# Patient Record
Sex: MALE | Race: WHITE | Employment: OTHER | URBAN - METROPOLITAN AREA
[De-identification: names, ages, dates, MRNs, and addresses within clinical notes are randomized per-mention and may not be internally consistent; named-entity substitution may affect disease eponyms.]

---

## 2017-04-06 RX ORDER — ALPRAZOLAM 0.5 MG/1
TABLET ORAL
Qty: 30 TABLET | Refills: 0 | Status: SHIPPED | OUTPATIENT
Start: 2017-04-06 | End: 2018-09-05

## 2017-10-27 ENCOUNTER — LABORATORY ENCOUNTER (OUTPATIENT)
Dept: LAB | Age: 62
End: 2017-10-27
Attending: INTERNAL MEDICINE
Payer: COMMERCIAL

## 2017-10-27 DIAGNOSIS — Z00.00 ROUTINE GENERAL MEDICAL EXAMINATION AT A HEALTH CARE FACILITY: ICD-10-CM

## 2017-10-27 PROCEDURE — 36415 COLL VENOUS BLD VENIPUNCTURE: CPT

## 2017-10-27 PROCEDURE — 83036 HEMOGLOBIN GLYCOSYLATED A1C: CPT

## 2017-10-27 PROCEDURE — 84443 ASSAY THYROID STIM HORMONE: CPT

## 2017-10-27 PROCEDURE — 85025 COMPLETE CBC W/AUTO DIFF WBC: CPT

## 2017-10-27 PROCEDURE — 80053 COMPREHEN METABOLIC PANEL: CPT

## 2017-10-27 PROCEDURE — 82306 VITAMIN D 25 HYDROXY: CPT

## 2017-10-27 PROCEDURE — 80061 LIPID PANEL: CPT

## 2017-10-27 PROCEDURE — 81003 URINALYSIS AUTO W/O SCOPE: CPT

## 2017-11-03 ENCOUNTER — OFFICE VISIT (OUTPATIENT)
Dept: INTERNAL MEDICINE CLINIC | Facility: CLINIC | Age: 62
End: 2017-11-03

## 2017-11-03 VITALS
DIASTOLIC BLOOD PRESSURE: 84 MMHG | HEIGHT: 73.25 IN | WEIGHT: 195.75 LBS | HEART RATE: 60 BPM | RESPIRATION RATE: 15 BRPM | BODY MASS INDEX: 25.66 KG/M2 | TEMPERATURE: 98 F | SYSTOLIC BLOOD PRESSURE: 134 MMHG

## 2017-11-03 DIAGNOSIS — Z23 NEED FOR SHINGLES VACCINE: ICD-10-CM

## 2017-11-03 DIAGNOSIS — Z00.00 ROUTINE GENERAL MEDICAL EXAMINATION AT A HEALTH CARE FACILITY: Primary | ICD-10-CM

## 2017-11-03 PROBLEM — R73.03 PREDIABETES: Status: ACTIVE | Noted: 2017-11-03

## 2017-11-03 PROCEDURE — 90736 HZV VACCINE LIVE SUBQ: CPT | Performed by: INTERNAL MEDICINE

## 2017-11-03 PROCEDURE — 99396 PREV VISIT EST AGE 40-64: CPT | Performed by: INTERNAL MEDICINE

## 2017-11-03 PROCEDURE — 90471 IMMUNIZATION ADMIN: CPT | Performed by: INTERNAL MEDICINE

## 2017-11-03 NOTE — PROGRESS NOTES
Patient presents with:  CPX: not fasting      HPI: The pt presents today for male CPX. He went for wellness labs recently (see below). The only issue was a slightly elevated A1c into the prediabetes range.   He's due for Shingles vaccine, otherwise, he's 11/22/2015  09/10/2016  10/15/2017      TDAP                  11/25/2013      Td                    07/05/2011    Pended                  Date(s) Pended    Zoster (Shingles)     11/03/2017        PE:  /84 (BP Location: Right arm, Patient Positio >=60 79   CALCIUM      8.3 - 10.3 mg/dL 9.1   ALKALINE PHOSPHATASE      45 - 117 U/L 69   AST (SGOT)      15 - 41 U/L 23   ALT (SGPT)      17 - 63 U/L 22   Total Bilirubin      0.1 - 2.0 mg/dL 0.9   TOTAL PROTEIN      6.1 - 8.3 g/dL 6.8   Albumin      3.5 of Internal Medicine  [facility]  [address] [facility],4Th Floor, [address], [facility], [address]  T: [phone]; F: [phone]       Orders Placed This Encounter      Zoster (Shingles) (34813) (DX V05.8/Z23)    Meds & Refills for this Visit:  No prescri

## 2018-08-21 ENCOUNTER — HOSPITAL ENCOUNTER (OUTPATIENT)
Dept: GENERAL RADIOLOGY | Age: 63
Discharge: HOME | End: 2018-08-21
Attending: INTERNAL MEDICINE
Payer: COMMERCIAL

## 2018-08-21 ENCOUNTER — OFFICE VISIT (OUTPATIENT)
Dept: INTERNAL MEDICINE CLINIC | Facility: CLINIC | Age: 63
End: 2018-08-21
Payer: COMMERCIAL

## 2018-08-21 VITALS
WEIGHT: 188.5 LBS | HEART RATE: 60 BPM | SYSTOLIC BLOOD PRESSURE: 127 MMHG | DIASTOLIC BLOOD PRESSURE: 72 MMHG | HEIGHT: 72.75 IN | BODY MASS INDEX: 24.98 KG/M2 | TEMPERATURE: 98 F | RESPIRATION RATE: 20 BRPM

## 2018-08-21 DIAGNOSIS — G89.29 CHRONIC MIDLINE LOW BACK PAIN WITHOUT SCIATICA: Primary | ICD-10-CM

## 2018-08-21 DIAGNOSIS — M19.041 ARTHRITIS OF FINGER OF BOTH HANDS: ICD-10-CM

## 2018-08-21 DIAGNOSIS — M54.50 CHRONIC MIDLINE LOW BACK PAIN WITHOUT SCIATICA: Primary | ICD-10-CM

## 2018-08-21 DIAGNOSIS — M54.50 CHRONIC MIDLINE LOW BACK PAIN WITHOUT SCIATICA: ICD-10-CM

## 2018-08-21 DIAGNOSIS — M19.042 ARTHRITIS OF FINGER OF BOTH HANDS: ICD-10-CM

## 2018-08-21 DIAGNOSIS — M67.449 DIGITAL MUCOUS CYST OF FINGER: ICD-10-CM

## 2018-08-21 DIAGNOSIS — G89.29 CHRONIC MIDLINE LOW BACK PAIN WITHOUT SCIATICA: ICD-10-CM

## 2018-08-21 PROCEDURE — 99214 OFFICE O/P EST MOD 30 MIN: CPT | Performed by: INTERNAL MEDICINE

## 2018-08-21 PROCEDURE — 72110 X-RAY EXAM L-2 SPINE 4/>VWS: CPT | Performed by: INTERNAL MEDICINE

## 2018-08-21 RX ORDER — HYDROCORTISONE ACETATE 0.5 %
CREAM (GRAM) TOPICAL
COMMUNITY
Start: 2018-04-02 | End: 2021-05-20 | Stop reason: ALTCHOICE

## 2018-08-21 NOTE — PROGRESS NOTES
Patient presents with:  Low Back Pain: last 6 weeks   Other: mucous cyst of finger      HPI: eTo Morales presents today for the following issues:    1.  Acute on chronic intermittent back pain - His pain goes back quite some time, but he's really noticed this ove Cuff Size: adult)   Pulse 60   Temp 98.2 °F (36.8 °C) (Oral)   Resp 20   Ht 72.75\"   Wt 188 lb 8 oz   BMI 25.04 kg/m²   Gen - A&Ox3, NAD  Back - spine is midline and w/o palp tenderness; ROM is full  Fingers - arthritic changes in fingers and large mucous

## 2018-08-22 PROBLEM — M47.816 ARTHRITIS, LUMBAR SPINE: Status: ACTIVE | Noted: 2018-08-22

## 2018-08-28 ENCOUNTER — HOSPITAL ENCOUNTER (OUTPATIENT)
Dept: PHYSICAL THERAPY | Facility: HOSPITAL | Age: 63
Setting detail: THERAPIES SERIES
Discharge: HOME | End: 2018-08-28
Attending: INTERNAL MEDICINE
Payer: COMMERCIAL

## 2018-08-28 DIAGNOSIS — M47.816 ARTHRITIS, LUMBAR SPINE: ICD-10-CM

## 2018-08-28 PROCEDURE — 97162 PT EVAL MOD COMPLEX 30 MIN: CPT

## 2018-08-28 PROCEDURE — 97140 MANUAL THERAPY 1/> REGIONS: CPT

## 2018-08-28 PROCEDURE — 97110 THERAPEUTIC EXERCISES: CPT

## 2018-08-29 NOTE — PROGRESS NOTES
SPINE EVALUATION:   Referring Physician: Dr. Rosalie Macedo  Diagnosis: Arthritis, lumbar spine [facility]) (M46.96)     Date of Service: 8/28/2018     PATIENT SUMMARY   Belle Benedict is a 61year old y/o male who presents to therapy today with complaints of extreme p unremarkable  Neuro Screen: unremarkable    Lumbar ROM:   Flexion: reaches to mid-shin, stiffness in lower back  Extension: 15 deg, pain across lower back  Sidebending: R WNL; L WNL  Rotation: R mildly restricted; L mildly restricted  Hip ROM:   Flexion: R (8 visits)      Frequency / Duration: Patient will be seen for 2 x/week or a total of 8 visits over a 90 day period. Treatment will include: Manual Therapy; Therapeutic Exercises; Neuromuscular Re-education; Therapeutic Activity;  Electrical Stim; Mechanica

## 2018-09-04 ENCOUNTER — HOSPITAL ENCOUNTER (OUTPATIENT)
Dept: PHYSICAL THERAPY | Facility: HOSPITAL | Age: 63
Setting detail: THERAPIES SERIES
Discharge: HOME | End: 2018-09-04
Attending: INTERNAL MEDICINE
Payer: COMMERCIAL

## 2018-09-04 PROCEDURE — 97140 MANUAL THERAPY 1/> REGIONS: CPT

## 2018-09-04 PROCEDURE — 97110 THERAPEUTIC EXERCISES: CPT

## 2018-09-04 NOTE — ADDENDUM NOTE
Encounter addended by: [place] PT on: 9/4/2018  1:50 PM<BR>    Actions taken: Charge Capture section accepted

## 2018-09-04 NOTE — PROGRESS NOTES
Dx: Arth[facility], lumbar spine ([facility] (M46.96)             Authorized # of Visits:  8 per POC         Next MD visit: none scheduled  Fall Risk: standard         Precautions: n/a             Subjective: Change in the sleeping position helped a lot.  Had only one TX#: 6/ Date:               TX#: 7/ Date:               TX#: 8/   Prone central PA mobs Gr II-III L4/5 and L5/S1 and R/L unilateral PA mobs S1-T12         JOSEPH stretch 30 sec         (B) quad stretch in prone         Lateral gapping mobilization (

## 2018-09-05 NOTE — TELEPHONE ENCOUNTER
From: Mary Jane Golden  Sent: 9/5/2018 10:58 AM CDT  Subject: Medication Renewal Request    Gib Hough L. Dennie Pleasure would like a refill of the following medications:     ALPRAZOLAM 0.5 MG Oral Tab Mary Peterson MD]    Preferred pharmacy: [place] [phone]

## 2018-09-06 RX ORDER — ALPRAZOLAM 0.5 MG/1
0.5 TABLET ORAL NIGHTLY PRN
Qty: 30 TABLET | Refills: 0 | Status: SHIPPED | OUTPATIENT
Start: 2018-09-06 | End: 2019-12-06

## 2018-09-21 ENCOUNTER — APPOINTMENT (OUTPATIENT)
Dept: PHYSICAL THERAPY | Facility: HOSPITAL | Age: 63
End: 2018-09-21
Attending: INTERNAL MEDICINE
Payer: COMMERCIAL

## 2018-09-24 ENCOUNTER — HOSPITAL ENCOUNTER (OUTPATIENT)
Dept: PHYSICAL THERAPY | Facility: HOSPITAL | Age: 63
Setting detail: THERAPIES SERIES
Discharge: HOME | End: 2018-09-24
Attending: INTERNAL MEDICINE
Payer: COMMERCIAL

## 2018-09-24 PROCEDURE — 97140 MANUAL THERAPY 1/> REGIONS: CPT

## 2018-09-24 PROCEDURE — 97110 THERAPEUTIC EXERCISES: CPT

## 2018-09-24 NOTE — PROGRESS NOTES
Dx: Arthritis, lumbar spine ([facility] (M46.96)             Authorized # of Visits:  8 per POC         Next MD visit: none scheduled  Fall Risk: standard         Precautions: n/a             Subjective: Reports some pain in lower back in the mornings but much b and L5/S1 and R/L unilateral PA mobs S1-T12        JOSEPH stretch 30 sec Prone press ups 1 x 10        (B) quad stretch in prone (B)        Lateral gapping mobilization (B) L side up         Open book stretch (B) L side, 1 x 10         FADDIR stretch (B) (B)

## 2018-09-26 ENCOUNTER — HOSPITAL ENCOUNTER (OUTPATIENT)
Dept: PHYSICAL THERAPY | Facility: HOSPITAL | Age: 63
Setting detail: THERAPIES SERIES
Discharge: HOME | End: 2018-09-26
Attending: INTERNAL MEDICINE
Payer: COMMERCIAL

## 2018-09-26 PROCEDURE — 97140 MANUAL THERAPY 1/> REGIONS: CPT

## 2018-09-26 PROCEDURE — 97110 THERAPEUTIC EXERCISES: CPT

## 2018-09-26 NOTE — PROGRESS NOTES
Dx: Arthritis, lumbar spine ([facility] (M46.96)             Authorized # of Visits:  8 per POC         Next MD visit: none scheduled  Fall Risk: standard         Precautions: n/a             Subjective: Felt more pain in lower back on Tuesday morning following press ups 1 x 10        (B) quad stretch in prone (B) (B)       Lateral gapping mobilization (B) L side up  (B)       Open book stretch (B) L side, 1 x 10  (B) 1 x 10       FADDIR stretch (B) (B) Prone plank 3 x 10 sec       Hamstring stretch with a strap

## 2018-10-01 ENCOUNTER — APPOINTMENT (OUTPATIENT)
Dept: PHYSICAL THERAPY | Facility: HOSPITAL | Age: 63
End: 2018-10-01
Attending: INTERNAL MEDICINE
Payer: COMMERCIAL

## 2018-10-03 ENCOUNTER — HOSPITAL ENCOUNTER (OUTPATIENT)
Dept: PHYSICAL THERAPY | Facility: HOSPITAL | Age: 63
Setting detail: THERAPIES SERIES
Discharge: HOME | End: 2018-10-03
Attending: INTERNAL MEDICINE
Payer: COMMERCIAL

## 2018-10-03 PROCEDURE — 97110 THERAPEUTIC EXERCISES: CPT

## 2018-10-03 PROCEDURE — 97140 MANUAL THERAPY 1/> REGIONS: CPT

## 2018-10-03 NOTE — PROGRESS NOTES
Physical Therapy Discharge Summary    Pt has attended 5 visits in Physical Therapy. Subjective: Pansdinesh Chris reports good decrease in pain in the mornings.  He states that while he still experiences some morning pain it is much less and goes away after doing of proper posture and body mechanics to decrease pain and improve spinal safety. Met  · Pt will report > 50% improvement in the level of morning pain and decreased difficulty walking in the morning.  Met  · Pt will report improved symptom centralization and stretch (B) L side, 1 x 10  (B) 1 x 10 (B)      FADDIR stretch (B) (B) Prone plank 3 x 10 sec Camel cat x 10      Hamstring stretch with a strap (B) (B) Quadruped arm leg balance 7 x 5 sec (B) Heel sitting 30 sec      Long axis traction (B) Thoracic spine

## 2018-10-17 ENCOUNTER — HOSPITAL ENCOUNTER (OUTPATIENT)
Age: 63
Discharge: HOME | End: 2018-10-17
Attending: FAMILY MEDICINE
Payer: COMMERCIAL

## 2018-10-17 VITALS
OXYGEN SATURATION: 97 % | HEART RATE: 62 BPM | DIASTOLIC BLOOD PRESSURE: 87 MMHG | RESPIRATION RATE: 22 BRPM | SYSTOLIC BLOOD PRESSURE: 129 MMHG | TEMPERATURE: 98 F

## 2018-10-17 DIAGNOSIS — Z51.89 VISIT FOR WOUND CHECK: Primary | ICD-10-CM

## 2018-10-17 PROCEDURE — 99212 OFFICE O/P EST SF 10 MIN: CPT

## 2018-10-17 PROCEDURE — 99202 OFFICE O/P NEW SF 15 MIN: CPT

## 2018-10-17 NOTE — ED PROVIDER NOTES
Patient Seen in: [facility]    History   Patient presents with: Other    Stated Complaint: right hand finger check    HPI    79-year-old male presents today for new dressing to be placed on his right third digit.   Patient encounter diagnosis)    Disposition:  Discharge  10/17/2018  6:48 pm    Follow-up:  Cristel Mcdonald MD  [address] [phone]      As needed        Medications Prescribed:  Current Discharge Medication List

## 2018-10-17 NOTE — ED INITIAL ASSESSMENT (HPI)
The patient is here for a new bandage to be placed tot he right 3rd digit. He had surgery today for the removal of a cyst from the 1st knuckle and he put gloves on today, when the glove was taken off, the dressing came off with it.   He denies any drainage

## 2018-11-05 ENCOUNTER — OFFICE VISIT (OUTPATIENT)
Dept: INTERNAL MEDICINE CLINIC | Facility: CLINIC | Age: 63
End: 2018-11-05
Payer: COMMERCIAL

## 2018-11-05 ENCOUNTER — PRIOR ORIGINAL RECORDS (OUTPATIENT)
Dept: OTHER | Age: 63
End: 2018-11-05

## 2018-11-05 ENCOUNTER — LAB ENCOUNTER (OUTPATIENT)
Dept: LAB | Age: 63
End: 2018-11-05
Attending: INTERNAL MEDICINE
Payer: COMMERCIAL

## 2018-11-05 VITALS
TEMPERATURE: 98 F | DIASTOLIC BLOOD PRESSURE: 72 MMHG | HEIGHT: 73 IN | WEIGHT: 192 LBS | HEART RATE: 54 BPM | SYSTOLIC BLOOD PRESSURE: 116 MMHG | BODY MASS INDEX: 25.45 KG/M2 | RESPIRATION RATE: 16 BRPM

## 2018-11-05 DIAGNOSIS — Z00.00 ROUTINE GENERAL MEDICAL EXAMINATION AT A HEALTH CARE FACILITY: ICD-10-CM

## 2018-11-05 DIAGNOSIS — N52.03 COMBINED ARTERIAL INSUFFICIENCY AND CORPORO-VENOUS OCCLUSIVE ERECTILE DYSFUNCTION: ICD-10-CM

## 2018-11-05 DIAGNOSIS — R73.03 PREDIABETES: ICD-10-CM

## 2018-11-05 DIAGNOSIS — Z00.00 ROUTINE GENERAL MEDICAL EXAMINATION AT A HEALTH CARE FACILITY: Primary | ICD-10-CM

## 2018-11-05 PROCEDURE — 84403 ASSAY OF TOTAL TESTOSTERONE: CPT | Performed by: INTERNAL MEDICINE

## 2018-11-05 PROCEDURE — 36415 COLL VENOUS BLD VENIPUNCTURE: CPT | Performed by: INTERNAL MEDICINE

## 2018-11-05 PROCEDURE — 81003 URINALYSIS AUTO W/O SCOPE: CPT | Performed by: INTERNAL MEDICINE

## 2018-11-05 PROCEDURE — 80050 GENERAL HEALTH PANEL: CPT | Performed by: INTERNAL MEDICINE

## 2018-11-05 PROCEDURE — 82306 VITAMIN D 25 HYDROXY: CPT | Performed by: INTERNAL MEDICINE

## 2018-11-05 PROCEDURE — 84402 ASSAY OF FREE TESTOSTERONE: CPT | Performed by: INTERNAL MEDICINE

## 2018-11-05 PROCEDURE — 83036 HEMOGLOBIN GLYCOSYLATED A1C: CPT | Performed by: INTERNAL MEDICINE

## 2018-11-05 PROCEDURE — 80061 LIPID PANEL: CPT | Performed by: INTERNAL MEDICINE

## 2018-11-05 PROCEDURE — 99396 PREV VISIT EST AGE 40-64: CPT | Performed by: INTERNAL MEDICINE

## 2018-11-05 PROCEDURE — 84153 ASSAY OF PSA TOTAL: CPT | Performed by: INTERNAL MEDICINE

## 2018-11-05 NOTE — PROGRESS NOTES
Patient presents with:  CPX: fasting      HPI: Kenya Aguayo presents today for male CPX. Doing well. Due for wellness labs. Health goals still center around longevity, vitality, and QOL. Review of Systems   Constitutional: Negative.     Genitourinary: Vaccine Medicare ()                          10/15/2013      Influenza             08/21/2009  12/22/2010  11/01/2014                            11/22/2015  09/10/2016  10/15/2017                            10/05/2018      TDAP                  11/25/ [E]      Meds & Refills for this Visit:  Requested Prescriptions      No prescriptions requested or ordered in this encounter       Imaging & Consults:  CARD TREADMILL STRESS, ADULT (CPT=93017)

## 2018-11-09 ENCOUNTER — HOSPITAL ENCOUNTER (OUTPATIENT)
Dept: CV DIAGNOSTICS | Facility: HOSPITAL | Age: 63
Discharge: HOME | End: 2018-11-09
Attending: INTERNAL MEDICINE
Payer: COMMERCIAL

## 2018-11-09 DIAGNOSIS — N52.03 COMBINED ARTERIAL INSUFFICIENCY AND CORPORO-VENOUS OCCLUSIVE ERECTILE DYSFUNCTION: ICD-10-CM

## 2018-11-09 DIAGNOSIS — R73.03 PREDIABETES: ICD-10-CM

## 2018-11-09 PROCEDURE — 93018 CV STRESS TEST I&R ONLY: CPT | Performed by: INTERNAL MEDICINE

## 2018-11-09 PROCEDURE — 93017 CV STRESS TEST TRACING ONLY: CPT | Performed by: INTERNAL MEDICINE

## 2018-11-14 ENCOUNTER — PATIENT MESSAGE (OUTPATIENT)
Dept: INTERNAL MEDICINE CLINIC | Facility: CLINIC | Age: 63
End: 2018-11-14

## 2018-11-14 NOTE — TELEPHONE ENCOUNTER
From: Wil Tucker  To: Faith Tracey MD  Sent: 11/14/2018 1:38 PM CST  Subject: Test Results Question    Hi Dr Juliano Blas, i have made a follow up appt with dr Mame Beyer regarding my stress test results for this Friday the 16th.  The person setting up the appt requ

## 2018-11-16 ENCOUNTER — PRIOR ORIGINAL RECORDS (OUTPATIENT)
Dept: OTHER | Age: 63
End: 2018-11-16

## 2018-11-16 ENCOUNTER — MYAURORA ACCOUNT LINK (OUTPATIENT)
Dept: OTHER | Age: 63
End: 2018-11-16

## 2018-11-16 LAB
ALBUMIN: 4 G/DL
ALKALINE PHOSPHATATE(ALK PHOS): 75 IU/L
ALT (SGPT): 21 U/L
AST (SGOT): 18 U/L
BILIRUBIN TOTAL: 1 MG/DL
BUN: 20 MG/DL
CALCIUM: 9 MG/DL
CHLORIDE: 104 MEQ/L
CHOLESTEROL, TOTAL: 187 MG/DL
CREATININE, SERUM: 1.02 MG/DL
GLOBULIN: 3.2 G/DL
GLUCOSE: 106 MG/DL
GLUCOSE: 106 MG/DL
HDL CHOLESTEROL: 65 MG/DL
HEMOGLOBIN A1C: 5.6 %
LDL CHOLESTEROL: 104 MG/DL
NON-HDL CHOLESTEROL: 122 MG/DL
POTASSIUM, SERUM: 4.5 MEQ/L
PROTEIN, TOTAL: 7.2 G/DL
SGOT (AST): 18 IU/L
SGPT (ALT): 21 IU/L
SODIUM: 139 MEQ/L
THYROID STIMULATING HORMONE: 1.51 MLU/L
TRIGLYCERIDES: 89 MG/DL

## 2018-11-29 ENCOUNTER — APPOINTMENT (OUTPATIENT)
Dept: GENERAL RADIOLOGY | Facility: HOSPITAL | Age: 63
End: 2018-11-29
Attending: INTERNAL MEDICINE
Payer: COMMERCIAL

## 2018-11-29 ENCOUNTER — HOSPITAL ENCOUNTER (OUTPATIENT)
Dept: CV DIAGNOSTICS | Facility: HOSPITAL | Age: 63
Setting detail: OBSERVATION
Discharge: HOME | End: 2018-11-30
Attending: INTERNAL MEDICINE | Admitting: INTERNAL MEDICINE
Payer: COMMERCIAL

## 2018-11-29 DIAGNOSIS — R94.39 ABNORMAL STRESS TEST: ICD-10-CM

## 2018-11-29 PROCEDURE — 93350 STRESS TTE ONLY: CPT | Performed by: INTERNAL MEDICINE

## 2018-11-29 PROCEDURE — 85730 THROMBOPLASTIN TIME PARTIAL: CPT | Performed by: INTERNAL MEDICINE

## 2018-11-29 PROCEDURE — 36415 COLL VENOUS BLD VENIPUNCTURE: CPT | Performed by: INTERNAL MEDICINE

## 2018-11-29 PROCEDURE — 93005 ELECTROCARDIOGRAM TRACING: CPT

## 2018-11-29 PROCEDURE — 93017 CV STRESS TEST TRACING ONLY: CPT | Performed by: INTERNAL MEDICINE

## 2018-11-29 PROCEDURE — 71045 X-RAY EXAM CHEST 1 VIEW: CPT | Performed by: INTERNAL MEDICINE

## 2018-11-29 PROCEDURE — 85025 COMPLETE CBC W/AUTO DIFF WBC: CPT | Performed by: INTERNAL MEDICINE

## 2018-11-29 PROCEDURE — 93018 CV STRESS TEST I&R ONLY: CPT | Performed by: INTERNAL MEDICINE

## 2018-11-29 PROCEDURE — 85610 PROTHROMBIN TIME: CPT | Performed by: INTERNAL MEDICINE

## 2018-11-29 PROCEDURE — 80048 BASIC METABOLIC PNL TOTAL CA: CPT | Performed by: INTERNAL MEDICINE

## 2018-11-29 PROCEDURE — 93010 ELECTROCARDIOGRAM REPORT: CPT | Performed by: INTERNAL MEDICINE

## 2018-11-29 PROCEDURE — 83735 ASSAY OF MAGNESIUM: CPT | Performed by: INTERNAL MEDICINE

## 2018-11-29 RX ORDER — ACETAMINOPHEN 325 MG/1
650 TABLET ORAL EVERY 6 HOURS PRN
Status: DISCONTINUED | OUTPATIENT
Start: 2018-11-29 | End: 2018-12-01

## 2018-11-29 RX ORDER — TEMAZEPAM 15 MG/1
15 CAPSULE ORAL NIGHTLY PRN
Status: DISCONTINUED | OUTPATIENT
Start: 2018-11-29 | End: 2018-12-01

## 2018-11-29 RX ORDER — SODIUM CHLORIDE 9 MG/ML
INJECTION, SOLUTION INTRAVENOUS CONTINUOUS
Status: DISCONTINUED | OUTPATIENT
Start: 2018-11-30 | End: 2018-12-01

## 2018-11-29 RX ORDER — ASPIRIN 81 MG/1
324 TABLET, CHEWABLE ORAL ONCE
Status: DISCONTINUED | OUTPATIENT
Start: 2018-11-29 | End: 2018-11-29

## 2018-11-29 RX ORDER — ENOXAPARIN SODIUM 100 MG/ML
40 INJECTION SUBCUTANEOUS DAILY
Status: DISCONTINUED | OUTPATIENT
Start: 2018-11-29 | End: 2018-11-30

## 2018-11-29 RX ORDER — ASPIRIN 81 MG/1
324 TABLET, CHEWABLE ORAL DAILY
Status: DISCONTINUED | OUTPATIENT
Start: 2018-11-30 | End: 2018-11-30

## 2018-11-29 RX ADMIN — ENOXAPARIN SODIUM 40 MG: 100 INJECTION SUBCUTANEOUS at 20:40:00

## 2018-11-29 RX ADMIN — SODIUM CHLORIDE: 9 INJECTION, SOLUTION INTRAVENOUS at 23:45:00

## 2018-11-29 NOTE — PLAN OF CARE
CARDIOVASCULAR - ADULT    • Maintains optimal cardiac output and hemodynamic stability Progressing    • Absence of cardiac arrhythmias or at baseline Progressing        RECEIVED FROM CARDIOGRAPHICS. VSS, DENIES ANY CP, SOB OR ANY DISCOMFORT AT THIS TIME.

## 2018-11-29 NOTE — PROGRESS NOTES
Here for stress echo. Test completed and EKG with abnormalities. Test shown to Dr Verenice Bridges and she spoke with patient. He will be admitted to hospital [facility] and a cath tomorrow. Pt verbalized understanding. Spoke with [facility] for a bed. Awaiting a room.

## 2018-11-29 NOTE — HISTORICAL OFFICE NOTE
Rosa Hernandez  : 1955  ACCOUNT:  [id number]  [phone]  PCP: Dr. New Bender     TODAY'S DATE: 2018  DICTATED BY:  [Dr. Neyda Mitchell: [abnormal test.]    HPI:    [On 2018, César Orona, a 61year old male, presented with xanthelasma. ENT: mucosa pink and moist. NECK: jugular venous pressure not elevated. RESP: respirations with normal rate and rhythm, clear to auscultation. GI: no masses, tenderness or hepatosplenomegaly, rectal deferred.  MS: adequate gait for exercise/silvana daily

## 2018-11-29 NOTE — CONSULTS
[facility]  Report of Consultation    Mandymona Jensen Patient Status:  Outpatient    1955 MRN [id number]   Location [address] Attending Chuy Rogers MD   Hosp Day # 0 PCP Nas Morrow MD     Reason for Consultation: abnor Systems:  All systems were reviewed and are negative except as described above in HPI. Physical Exam:  There were no vitals taken for this visit. No data recorded.     Wt Readings from Last 3 Encounters:  11/05/18 : 192 lb (87.1 kg)  09/07/18 : 185 lb (

## 2018-11-30 ENCOUNTER — APPOINTMENT (OUTPATIENT)
Dept: INTERVENTIONAL RADIOLOGY/VASCULAR | Facility: HOSPITAL | Age: 63
End: 2018-11-30
Attending: INTERNAL MEDICINE
Payer: COMMERCIAL

## 2018-11-30 ENCOUNTER — APPOINTMENT (OUTPATIENT)
Dept: CV DIAGNOSTICS | Facility: HOSPITAL | Age: 63
End: 2018-11-30
Attending: INTERNAL MEDICINE
Payer: COMMERCIAL

## 2018-11-30 VITALS
RESPIRATION RATE: 18 BRPM | HEART RATE: 66 BPM | TEMPERATURE: 98 F | WEIGHT: 186.75 LBS | HEIGHT: 73 IN | OXYGEN SATURATION: 98 % | SYSTOLIC BLOOD PRESSURE: 118 MMHG | DIASTOLIC BLOOD PRESSURE: 79 MMHG | BODY MASS INDEX: 24.75 KG/M2

## 2018-11-30 PROCEDURE — 85730 THROMBOPLASTIN TIME PARTIAL: CPT | Performed by: INTERNAL MEDICINE

## 2018-11-30 PROCEDURE — 93306 TTE W/DOPPLER COMPLETE: CPT | Performed by: INTERNAL MEDICINE

## 2018-11-30 PROCEDURE — B2111ZZ FLUOROSCOPY OF MULTIPLE CORONARY ARTERIES USING LOW OSMOLAR CONTRAST: ICD-10-PCS | Performed by: INTERNAL MEDICINE

## 2018-11-30 PROCEDURE — 85610 PROTHROMBIN TIME: CPT | Performed by: INTERNAL MEDICINE

## 2018-11-30 PROCEDURE — 99152 MOD SED SAME PHYS/QHP 5/>YRS: CPT

## 2018-11-30 PROCEDURE — 4A023N7 MEASUREMENT OF CARDIAC SAMPLING AND PRESSURE, LEFT HEART, PERCUTANEOUS APPROACH: ICD-10-PCS | Performed by: INTERNAL MEDICINE

## 2018-11-30 PROCEDURE — 93458 L HRT ARTERY/VENTRICLE ANGIO: CPT

## 2018-11-30 PROCEDURE — B2151ZZ FLUOROSCOPY OF LEFT HEART USING LOW OSMOLAR CONTRAST: ICD-10-PCS | Performed by: INTERNAL MEDICINE

## 2018-11-30 RX ORDER — ACETAMINOPHEN 325 MG/1
650 TABLET ORAL EVERY 6 HOURS PRN
Qty: 2 TABLET | Refills: 0 | Status: SHIPPED | OUTPATIENT
Start: 2018-11-30 | End: 2019-11-12 | Stop reason: ALTCHOICE

## 2018-11-30 RX ORDER — MIDAZOLAM HYDROCHLORIDE 1 MG/ML
INJECTION INTRAMUSCULAR; INTRAVENOUS
Status: COMPLETED
Start: 2018-11-30 | End: 2018-11-30

## 2018-11-30 RX ORDER — HEPARIN SODIUM 5000 [USP'U]/ML
INJECTION, SOLUTION INTRAVENOUS; SUBCUTANEOUS
Status: COMPLETED
Start: 2018-11-30 | End: 2018-11-30

## 2018-11-30 RX ORDER — LIDOCAINE HYDROCHLORIDE 10 MG/ML
INJECTION, SOLUTION EPIDURAL; INFILTRATION; INTRACAUDAL; PERINEURAL
Status: COMPLETED
Start: 2018-11-30 | End: 2018-11-30

## 2018-11-30 RX ORDER — ASPIRIN 81 MG/1
324 TABLET, CHEWABLE ORAL ONCE
Status: DISCONTINUED | OUTPATIENT
Start: 2018-11-30 | End: 2018-11-30

## 2018-11-30 RX ORDER — SODIUM CHLORIDE 9 MG/ML
INJECTION, SOLUTION INTRAVENOUS CONTINUOUS
Status: DISCONTINUED | OUTPATIENT
Start: 2018-11-30 | End: 2018-12-01

## 2018-11-30 RX ORDER — SODIUM CHLORIDE 9 MG/ML
INJECTION, SOLUTION INTRAVENOUS CONTINUOUS
Status: DISCONTINUED | OUTPATIENT
Start: 2018-11-30 | End: 2018-11-30

## 2018-11-30 RX ADMIN — SODIUM CHLORIDE: 9 INJECTION, SOLUTION INTRAVENOUS at 18:18:00

## 2018-11-30 RX ADMIN — SODIUM CHLORIDE: 9 INJECTION, SOLUTION INTRAVENOUS at 15:27:00

## 2018-11-30 RX ADMIN — ASPIRIN 324 MG: 81 TABLET, CHEWABLE ORAL at 08:25:00

## 2018-11-30 NOTE — PLAN OF CARE
Received bedside report on this Pt. At 1515. Pt. Awake, A&Ox4, calm, pleasant and cooperative. SB/SR on Tele monitor, sats greater than 92% on RA, denies any pain or distress. Pt. Has NPO all day for heart cath.   POC discussed with Pt., and questions an

## 2018-11-30 NOTE — PLAN OF CARE
CARDIOVASCULAR - ADULT    • Maintains optimal cardiac output and hemodynamic stability Progressing    • Absence of cardiac arrhythmias or at baseline Progressing          Assumed care of pt at 2300. Pt is alert and oriented x4.  Pt is on RA with SpO2 at 96%

## 2018-11-30 NOTE — PROGRESS NOTES
CARDIOVASCULAR - ADULT     • Maintains optimal cardiac output and hemodynamic stability Progressing     • Absence of cardiac arrhythmias or at baseline Progressing            ASSUMED CARE 0700. TELE SB WITH HR-50S. O2 SAT-96% ROOM AIR.   DENIES C/O CHEST

## 2018-12-01 NOTE — PLAN OF CARE
Pt. Returned from cath lab to room 3300 at [phone]. Pt. Awake, A&Ox4, SB/SR on Tele monitor. Right groin soft, dressing C/D/I, no s/s of bleeding or hematoma noted, CMS to RLE intact, pulses +2.   Dr. Enrico Gowers came up and saw Pt., said he can be discharged after

## 2018-12-01 NOTE — PROGRESS NOTES
[facility]  [facility]  Cath Note    Mia Duvall Location: Cath lab     MRN [id number]   Admission Date 11/29/2018 Operation Date 11/30/2018   Attending Physician Domenica Frausto MD Operating Physician Fernando Barrera MD     Pre-Cath Diagnosis

## 2018-12-03 NOTE — PROCEDURES
[address]    PATIENT'S NAME: Mignon Rodríguez   ATTENDING PHYSICIAN: Doug Kyle M.D. OPERATING PHYSICIAN: Doug Kyle M.D.    PATIENT ACCOUNT#:   [de-identified]    LOCATION:  [address] [place] [address]  MEDICAL RECORD #:   [id number]       DATE OF BIRTH:  [birth year] coronary artery appears normal.  It bifurcates into the left anterior descending and circumflex coronary distributions. The left coronary artery and its branches appear free of significant obstructive epicardial CAD.   The right coronary artery is large an

## 2018-12-04 LAB
HEMATOCRIT: 47.8 %
HEMOGLOBIN: 15.4 G/DL
PLATELETS: 252 K/UL
RED BLOOD COUNT: 5.14 X 10-6/U
VITAMIN D 25-OH: 30.2 NG/ML
WHITE BLOOD COUNT: 5.2 X 10-3/U

## 2018-12-05 NOTE — PAYOR COMM NOTE
--------------  ADMISSION REVIEW     Payor: CORY SKY  Subscriber #:  [id number]  Authorization Number: [id number]    Admit date: 11/29/18  Admit time: 1636  Admitted for Observation       Admitting Physician: Shaniqua Encarnacion MD  Attending Physician:  Rita shahid drugs.     Allergies:     Seasonal                    Comment:hayfever     Medications:     No current facility-administered medications for this encounter.      Review of Systems:  All systems were reviewed and are negative except as described above in HP positive stress echocardiogram.  Normal cardiac catheterization.   PROCEDURE PERFORMED:  Cardiac catheterization.     INDICATION FOR PROCEDURE:  Evaluate cardiac anatomy and function.     PROCEDURAL COMMENTS:  Left heart catheterization, left ventriculograp injection through the sheath shows excellent positioning in the common femoral artery just below the inguinal rim.     CONCLUSIONS:    1. Normal hemodynamics. 2.     Normal left ventricular systolic function.   3.     Right dominant coronary artery cir

## 2018-12-17 ENCOUNTER — PATIENT MESSAGE (OUTPATIENT)
Dept: INTERNAL MEDICINE CLINIC | Facility: CLINIC | Age: 63
End: 2018-12-17

## 2018-12-17 DIAGNOSIS — N52.03 COMBINED ARTERIAL INSUFFICIENCY AND CORPORO-VENOUS OCCLUSIVE ERECTILE DYSFUNCTION: Primary | ICD-10-CM

## 2018-12-18 ENCOUNTER — PRIOR ORIGINAL RECORDS (OUTPATIENT)
Dept: OTHER | Age: 63
End: 2018-12-18

## 2018-12-18 ENCOUNTER — MYAURORA ACCOUNT LINK (OUTPATIENT)
Dept: OTHER | Age: 63
End: 2018-12-18

## 2018-12-18 RX ORDER — SILDENAFIL 50 MG/1
50 TABLET, FILM COATED ORAL
Qty: 8 TABLET | Refills: 3 | Status: SHIPPED | OUTPATIENT
Start: 2018-12-18 | End: 2021-06-02

## 2018-12-19 NOTE — TELEPHONE ENCOUNTER
From: Donna De Dios  To: Huseyin Rinaldi MD  Sent: 12/17/2018 11:16 AM CST  Subject: Visit Follow-up Question    Hi Dr Dalia Alford, After two stress tests and the resulting angiogram, the results are that the stress and echo tests showed false positives.  I believe

## 2018-12-19 NOTE — PROGRESS NOTES
Script for Viagra given. Delfina Mora. Dalia Alford MD  Diplomate, American Board of Internal Medicine  [address] [address] [address]  [facility],4Th Floor, [address], [facility], [address]  T: [phone]; F: Haylieeti 5

## 2019-02-28 VITALS
DIASTOLIC BLOOD PRESSURE: 60 MMHG | HEIGHT: 71 IN | HEART RATE: 54 BPM | BODY MASS INDEX: 40.6 KG/M2 | SYSTOLIC BLOOD PRESSURE: 120 MMHG | WEIGHT: 290 LBS

## 2019-02-28 VITALS
BODY MASS INDEX: 25.45 KG/M2 | HEIGHT: 73 IN | DIASTOLIC BLOOD PRESSURE: 82 MMHG | SYSTOLIC BLOOD PRESSURE: 128 MMHG | WEIGHT: 192 LBS | HEART RATE: 56 BPM

## 2019-11-12 ENCOUNTER — OFFICE VISIT (OUTPATIENT)
Dept: INTERNAL MEDICINE CLINIC | Facility: CLINIC | Age: 64
End: 2019-11-12
Payer: COMMERCIAL

## 2019-11-12 VITALS
OXYGEN SATURATION: 98 % | BODY MASS INDEX: 25.31 KG/M2 | SYSTOLIC BLOOD PRESSURE: 132 MMHG | HEART RATE: 68 BPM | WEIGHT: 191 LBS | HEIGHT: 73 IN | TEMPERATURE: 98 F | RESPIRATION RATE: 12 BRPM | DIASTOLIC BLOOD PRESSURE: 72 MMHG

## 2019-11-12 DIAGNOSIS — J30.9 ALLERGIC RHINITIS, UNSPECIFIED SEASONALITY, UNSPECIFIED TRIGGER: ICD-10-CM

## 2019-11-12 DIAGNOSIS — N52.03 COMBINED ARTERIAL INSUFFICIENCY AND CORPORO-VENOUS OCCLUSIVE ERECTILE DYSFUNCTION: ICD-10-CM

## 2019-11-12 DIAGNOSIS — G47.00 INSOMNIA, UNSPECIFIED TYPE: ICD-10-CM

## 2019-11-12 DIAGNOSIS — Z12.5 SCREENING PSA (PROSTATE SPECIFIC ANTIGEN): ICD-10-CM

## 2019-11-12 DIAGNOSIS — Z00.00 ROUTINE GENERAL MEDICAL EXAMINATION AT A HEALTH CARE FACILITY: Primary | ICD-10-CM

## 2019-11-12 PROBLEM — R94.39 ABNORMAL STRESS TEST: Status: RESOLVED | Noted: 2018-11-29 | Resolved: 2019-11-12

## 2019-11-12 PROBLEM — R73.03 PREDIABETES: Status: RESOLVED | Noted: 2017-11-03 | Resolved: 2019-11-12

## 2019-11-12 PROCEDURE — 99396 PREV VISIT EST AGE 40-64: CPT | Performed by: INTERNAL MEDICINE

## 2019-11-12 NOTE — PROGRESS NOTES
Patient presents with:  CPX      HPI: Yehuda Douglass presents today for male CPX. Stable health. Due for wellness labs. Health goals center around longevity, vitality, and QOL. Review of Systems   All other systems reviewed and are negative.     Patient Active Stroke Neg          Immunization History  Administered            Date(s) Administered    Fluzone Vaccine Medicare ()                          10/15/2013      Influenza             08/21/2009 12/22/2010 11/01/2014 11/22/20 the time and opportunity to ask questions, which were then answered to the best of my ability. Mukesh Odonnell. Bryan Temple MD  Diplomate, American Board of Internal Medicine  Member, American College of [address]  [address]

## 2019-11-13 ENCOUNTER — LAB ENCOUNTER (OUTPATIENT)
Dept: LAB | Age: 64
End: 2019-11-13
Attending: INTERNAL MEDICINE
Payer: COMMERCIAL

## 2019-11-13 DIAGNOSIS — Z00.00 ROUTINE GENERAL MEDICAL EXAMINATION AT A HEALTH CARE FACILITY: ICD-10-CM

## 2019-11-13 DIAGNOSIS — N52.03 COMBINED ARTERIAL INSUFFICIENCY AND CORPORO-VENOUS OCCLUSIVE ERECTILE DYSFUNCTION: ICD-10-CM

## 2019-11-13 DIAGNOSIS — Z12.5 SCREENING PSA (PROSTATE SPECIFIC ANTIGEN): ICD-10-CM

## 2019-11-13 PROCEDURE — 84403 ASSAY OF TOTAL TESTOSTERONE: CPT | Performed by: INTERNAL MEDICINE

## 2019-11-13 PROCEDURE — 84402 ASSAY OF FREE TESTOSTERONE: CPT | Performed by: INTERNAL MEDICINE

## 2019-11-13 PROCEDURE — 83036 HEMOGLOBIN GLYCOSYLATED A1C: CPT | Performed by: INTERNAL MEDICINE

## 2019-11-13 PROCEDURE — 84153 ASSAY OF PSA TOTAL: CPT | Performed by: INTERNAL MEDICINE

## 2019-11-13 PROCEDURE — 36415 COLL VENOUS BLD VENIPUNCTURE: CPT | Performed by: INTERNAL MEDICINE

## 2019-11-13 PROCEDURE — 80061 LIPID PANEL: CPT | Performed by: INTERNAL MEDICINE

## 2019-11-13 PROCEDURE — 82306 VITAMIN D 25 HYDROXY: CPT | Performed by: INTERNAL MEDICINE

## 2019-11-13 PROCEDURE — 80050 GENERAL HEALTH PANEL: CPT | Performed by: INTERNAL MEDICINE

## 2019-12-06 NOTE — TELEPHONE ENCOUNTER
Alprazolam 0.5 mg 1 tab nightly prn filled 9-6-18 30 with 0 refills     LOV 11-12-19    RTC 1 year or PRN.     No upcoming apt on file   Labs 11-13-19

## 2019-12-07 RX ORDER — ALPRAZOLAM 0.5 MG/1
TABLET ORAL
Qty: 30 TABLET | Refills: 0 | Status: SHIPPED | OUTPATIENT
Start: 2019-12-07

## 2020-02-25 ENCOUNTER — APPOINTMENT (OUTPATIENT)
Dept: LAB | Age: 65
End: 2020-02-25
Attending: INTERNAL MEDICINE
Payer: COMMERCIAL

## 2020-02-25 DIAGNOSIS — E78.00 HYPERCHOLESTEROLEMIA: ICD-10-CM

## 2020-02-25 LAB
CHOLEST SMN-MCNC: 199 MG/DL (ref ?–200)
HDLC SERPL-MCNC: 61 MG/DL (ref 40–59)
LDLC SERPL CALC-MCNC: 118 MG/DL (ref ?–100)
NONHDLC SERPL-MCNC: 138 MG/DL (ref ?–130)
PATIENT FASTING Y/N/NP: YES
TRIGL SERPL-MCNC: 99 MG/DL (ref 30–149)
VLDLC SERPL CALC-MCNC: 20 MG/DL (ref 0–30)

## 2020-02-25 PROCEDURE — 36415 COLL VENOUS BLD VENIPUNCTURE: CPT | Performed by: INTERNAL MEDICINE

## 2020-02-25 PROCEDURE — 80061 LIPID PANEL: CPT | Performed by: INTERNAL MEDICINE

## 2020-11-09 ENCOUNTER — NURSE ONLY (OUTPATIENT)
Dept: INTERNAL MEDICINE CLINIC | Facility: CLINIC | Age: 65
End: 2020-11-09
Payer: COMMERCIAL

## 2020-11-09 ENCOUNTER — LAB ENCOUNTER (OUTPATIENT)
Dept: LAB | Age: 65
End: 2020-11-09
Attending: INTERNAL MEDICINE
Payer: COMMERCIAL

## 2020-11-09 DIAGNOSIS — R69 DIAGNOSIS UNKNOWN: Primary | ICD-10-CM

## 2020-11-09 DIAGNOSIS — Z00.00 LABORATORY EXAMINATION ORDERED AS PART OF A ROUTINE GENERAL MEDICAL EXAMINATION: Primary | ICD-10-CM

## 2020-11-09 PROCEDURE — 81003 URINALYSIS AUTO W/O SCOPE: CPT | Performed by: INTERNAL MEDICINE

## 2020-11-09 PROCEDURE — 92551 PURE TONE HEARING TEST AIR: CPT | Performed by: INTERNAL MEDICINE

## 2020-11-09 PROCEDURE — 93000 ELECTROCARDIOGRAM COMPLETE: CPT | Performed by: INTERNAL MEDICINE

## 2020-11-19 ENCOUNTER — OFFICE VISIT (OUTPATIENT)
Dept: INTERNAL MEDICINE CLINIC | Facility: CLINIC | Age: 65
End: 2020-11-19
Payer: COMMERCIAL

## 2020-11-19 VITALS
TEMPERATURE: 98 F | BODY MASS INDEX: 25.42 KG/M2 | HEIGHT: 73 IN | OXYGEN SATURATION: 98 % | SYSTOLIC BLOOD PRESSURE: 138 MMHG | DIASTOLIC BLOOD PRESSURE: 86 MMHG | WEIGHT: 191.81 LBS | HEART RATE: 67 BPM

## 2020-11-19 DIAGNOSIS — Z00.00 ROUTINE GENERAL MEDICAL EXAMINATION AT A HEALTH CARE FACILITY: Primary | ICD-10-CM

## 2020-11-19 DIAGNOSIS — E55.9 VITAMIN D INSUFFICIENCY: ICD-10-CM

## 2020-11-19 DIAGNOSIS — E78.00 HYPERCHOLESTEROLEMIA: ICD-10-CM

## 2020-11-19 DIAGNOSIS — Z23 NEED FOR SHINGLES VACCINE: ICD-10-CM

## 2020-11-19 DIAGNOSIS — R79.82 ELEVATED C-REACTIVE PROTEIN (CRP): ICD-10-CM

## 2020-11-19 PROCEDURE — 3075F SYST BP GE 130 - 139MM HG: CPT | Performed by: INTERNAL MEDICINE

## 2020-11-19 PROCEDURE — 90471 IMMUNIZATION ADMIN: CPT | Performed by: INTERNAL MEDICINE

## 2020-11-19 PROCEDURE — 90750 HZV VACC RECOMBINANT IM: CPT | Performed by: INTERNAL MEDICINE

## 2020-11-19 PROCEDURE — 99397 PER PM REEVAL EST PAT 65+ YR: CPT | Performed by: INTERNAL MEDICINE

## 2020-11-19 PROCEDURE — 99072 ADDL SUPL MATRL&STAF TM PHE: CPT | Performed by: INTERNAL MEDICINE

## 2020-11-19 PROCEDURE — 3008F BODY MASS INDEX DOCD: CPT | Performed by: INTERNAL MEDICINE

## 2020-11-19 PROCEDURE — 3079F DIAST BP 80-89 MM HG: CPT | Performed by: INTERNAL MEDICINE

## 2020-11-19 NOTE — PATIENT INSTRUCTIONS
Bethany Smith,    1. Double up on the Vitamin D supplement. 2. Physical activity is paramount to reducing body fat mass. 3. Plant-based approaches are fantastic to reduce inflammation in the body. 4. Double up on the fish oil supplementation.  We may consider a

## 2020-11-19 NOTE — PROGRESS NOTES
Patient presents with: Well Adult      HPI: Jose Ramon Rahrayastrid presents for annual CPX. Stable health. His health goals continue to center around longevity, vitality, and QOL. He went for labs thru [address] [facility]).      Review of Systems   All other systems r • Heart Disease Neg    • Stroke Neg          Immunization History  Administered            Date(s) Administered    FLU VAC High Dose 65 YRS & Older PRSV Free (55940)                          10/11/2020      Fluzone Vaccine Medicare () 120  Apo B 89  A1c 5.6  TMAO 2.1  B12 337  Vit D 29.5  OmegaCheck 4.0  Creat 0.97  AST 20  ALT 11  PSA 0.744  TSH 1.430  WBC 5.4  Hgb 15.9  Plt 259      A/P:  Routine general medical examination at a health care facility  (primary encounter diagnosis)  Katalina Azevedo

## 2021-05-03 ENCOUNTER — NURSE ONLY (OUTPATIENT)
Dept: INTERNAL MEDICINE CLINIC | Facility: CLINIC | Age: 66
End: 2021-05-03
Payer: COMMERCIAL

## 2021-05-20 ENCOUNTER — OFFICE VISIT (OUTPATIENT)
Dept: INTERNAL MEDICINE CLINIC | Facility: CLINIC | Age: 66
End: 2021-05-20
Payer: COMMERCIAL

## 2021-05-20 VITALS
BODY MASS INDEX: 25.88 KG/M2 | HEIGHT: 73 IN | WEIGHT: 195.31 LBS | OXYGEN SATURATION: 98 % | TEMPERATURE: 97 F | RESPIRATION RATE: 16 BRPM | DIASTOLIC BLOOD PRESSURE: 78 MMHG | HEART RATE: 65 BPM | SYSTOLIC BLOOD PRESSURE: 138 MMHG

## 2021-05-20 DIAGNOSIS — Z71.2 ENCOUNTER TO DISCUSS TEST RESULTS: Primary | ICD-10-CM

## 2021-05-20 DIAGNOSIS — Z23 NEED FOR SHINGLES VACCINE: ICD-10-CM

## 2021-05-20 DIAGNOSIS — Z13.6 SCREENING FOR CARDIOVASCULAR CONDITION: ICD-10-CM

## 2021-05-20 PROCEDURE — 90750 HZV VACC RECOMBINANT IM: CPT | Performed by: INTERNAL MEDICINE

## 2021-05-20 PROCEDURE — 3078F DIAST BP <80 MM HG: CPT | Performed by: INTERNAL MEDICINE

## 2021-05-20 PROCEDURE — 99214 OFFICE O/P EST MOD 30 MIN: CPT | Performed by: INTERNAL MEDICINE

## 2021-05-20 PROCEDURE — 3075F SYST BP GE 130 - 139MM HG: CPT | Performed by: INTERNAL MEDICINE

## 2021-05-20 PROCEDURE — 3008F BODY MASS INDEX DOCD: CPT | Performed by: INTERNAL MEDICINE

## 2021-05-20 PROCEDURE — 90471 IMMUNIZATION ADMIN: CPT | Performed by: INTERNAL MEDICINE

## 2021-05-20 NOTE — PROGRESS NOTES
Patient presents with: Follow - Up: 6 months and labs       HPI: Waylon Telles presents today for 6-month f/u for test results.  He went for labs thru [facility]) dated 5/3/21 with results as follows:    Labs (select via Freda Mitchell 3 dated 5/3/21):  Hs-CRP 1.8 (88.6 kg)   SpO2 98%   BMI 25.77 kg/m²   Wt Readings from Last 6 Encounters:  05/20/21 : 195 lb 4.8 oz (88.6 kg)  11/19/20 : 191 lb 12.8 oz (87 kg)  11/12/19 : 191 lb (86.6 kg)  11/29/18 : 186 lb 11.7 oz (84.7 kg)  11/05/18 : 192 lb (87.1 kg)  09/07/18 : 1

## 2021-06-02 DIAGNOSIS — N52.03 COMBINED ARTERIAL INSUFFICIENCY AND CORPORO-VENOUS OCCLUSIVE ERECTILE DYSFUNCTION: ICD-10-CM

## 2021-06-02 NOTE — TELEPHONE ENCOUNTER
Refill request on Sildenafil 50mg   Quantity of: 8 tabs  Instructions: to take 1 tab po prn for ED    Last refill was on: 2018 for 8-tabs, 3-refill(s)  Last ov was: 5/20/2021

## 2021-06-03 RX ORDER — SILDENAFIL 50 MG/1
50 TABLET, FILM COATED ORAL
Qty: 8 TABLET | Refills: 3 | Status: SHIPPED | OUTPATIENT
Start: 2021-06-03

## 2021-11-08 ENCOUNTER — NURSE ONLY (OUTPATIENT)
Dept: INTERNAL MEDICINE CLINIC | Facility: CLINIC | Age: 66
End: 2021-11-08
Payer: COMMERCIAL

## 2021-11-08 DIAGNOSIS — Z00.00 LABORATORY EXAMINATION ORDERED AS PART OF A ROUTINE GENERAL MEDICAL EXAMINATION: Primary | ICD-10-CM

## 2021-11-08 PROCEDURE — 92551 PURE TONE HEARING TEST AIR: CPT | Performed by: INTERNAL MEDICINE

## 2021-11-08 PROCEDURE — 93923 UPR/LXTR ART STDY 3+ LVLS: CPT | Performed by: INTERNAL MEDICINE

## 2021-11-08 PROCEDURE — 81001 URINALYSIS AUTO W/SCOPE: CPT | Performed by: INTERNAL MEDICINE

## 2021-11-08 NOTE — PROGRESS NOTES
*BODY COMPOSITION:    YES:x       NO:       REASON TEST NOT PERFORMED:   _____________________________________________________________________________  *VENIPUNCTURE    YES: x      NO:        REASON VENIPUNCTURE NOT PERFORMED:      LEFT A/C: x 1 stick land

## 2021-11-18 ENCOUNTER — OFFICE VISIT (OUTPATIENT)
Dept: INTERNAL MEDICINE CLINIC | Facility: CLINIC | Age: 66
End: 2021-11-18
Payer: COMMERCIAL

## 2021-11-18 VITALS
BODY MASS INDEX: 25.2 KG/M2 | HEIGHT: 73 IN | RESPIRATION RATE: 16 BRPM | OXYGEN SATURATION: 96 % | DIASTOLIC BLOOD PRESSURE: 76 MMHG | HEART RATE: 63 BPM | WEIGHT: 190.13 LBS | SYSTOLIC BLOOD PRESSURE: 130 MMHG | TEMPERATURE: 98 F

## 2021-11-18 DIAGNOSIS — J30.9 ALLERGIC RHINITIS, UNSPECIFIED SEASONALITY, UNSPECIFIED TRIGGER: ICD-10-CM

## 2021-11-18 DIAGNOSIS — Z12.9 SCREENING FOR CANCER: ICD-10-CM

## 2021-11-18 DIAGNOSIS — G47.00 INSOMNIA, UNSPECIFIED TYPE: ICD-10-CM

## 2021-11-18 DIAGNOSIS — Z85.828 HX OF SQUAMOUS CELL CARCINOMA OF SKIN: ICD-10-CM

## 2021-11-18 DIAGNOSIS — R82.90 ABNORMAL URINALYSIS: ICD-10-CM

## 2021-11-18 DIAGNOSIS — N52.03 COMBINED ARTERIAL INSUFFICIENCY AND CORPORO-VENOUS OCCLUSIVE ERECTILE DYSFUNCTION: ICD-10-CM

## 2021-11-18 DIAGNOSIS — Z00.00 ROUTINE GENERAL MEDICAL EXAMINATION AT A HEALTH CARE FACILITY: Primary | ICD-10-CM

## 2021-11-18 DIAGNOSIS — M47.816 ARTHRITIS, LUMBAR SPINE: ICD-10-CM

## 2021-11-18 PROCEDURE — 81003 URINALYSIS AUTO W/O SCOPE: CPT | Performed by: INTERNAL MEDICINE

## 2021-11-18 PROCEDURE — 3075F SYST BP GE 130 - 139MM HG: CPT | Performed by: INTERNAL MEDICINE

## 2021-11-18 PROCEDURE — 3008F BODY MASS INDEX DOCD: CPT | Performed by: INTERNAL MEDICINE

## 2021-11-18 PROCEDURE — 3078F DIAST BP <80 MM HG: CPT | Performed by: INTERNAL MEDICINE

## 2021-11-18 PROCEDURE — 93000 ELECTROCARDIOGRAM COMPLETE: CPT | Performed by: INTERNAL MEDICINE

## 2021-11-18 PROCEDURE — 99397 PER PM REEVAL EST PAT 65+ YR: CPT | Performed by: INTERNAL MEDICINE

## 2021-11-18 NOTE — PROGRESS NOTES
Patient presents with: Well Adult      HPI: Gerald Ramos presents today for his [facility] [employer] [facility]). Stable health. He went for wellness labs thru [facility]) dated 11/8/21 (see below).  His health goals continue to center around them BURP-LESS) 1200 MG Oral Cap, Take 2 cap by mouth daily, Disp: , Rfl:   •  Multiple Vitamin (MULTI-VITAMIN) Oral Tab, Take 1 Tab by mouth daily. , Disp: , Rfl:     Social History    Tobacco Use      Smoking status: Never Smoker      Smokeless tobacco: Never PERRL, EOMI, OP is clear; TMs clear B  Neck - supple, no JVD, no thyromegaly  Lymph - no palp LAD  Lungs - CTAB  CV - RRR, nl s1, s2  Abd - soft, NABS, NT, ND  Ext - no c/c/e  Neuro - CNs 2-12 grossly intact, no focal deficits; 2+ DTRs  Psych - nl mood/aff 77  TSH 1.87  PSA 0.751  WBC 5.4  Hemoglobin 15.1  Platelet count 642    Component      Latest Ref Rng & Units 11/8/2021   Color Urine      Yellow Straw   Clarity Urine      Clear Clear   Spec Gravity      1.001 - 1.030 1.008   Glucose Urine      Negative diagnosis)  Combined arterial insufficiency and corporo-venous occlusive erectile dysfunction  Allergic rhinitis, unspecified seasonality, unspecified trigger  Insomnia, unspecified type  Hx of squamous cell carcinoma of skin - l forearm  Arthritis, lumbar

## 2022-01-24 ENCOUNTER — OFFICE VISIT (OUTPATIENT)
Dept: INTERNAL MEDICINE CLINIC | Facility: CLINIC | Age: 67
End: 2022-01-24
Payer: COMMERCIAL

## 2022-01-24 ENCOUNTER — HOSPITAL ENCOUNTER (OUTPATIENT)
Dept: GENERAL RADIOLOGY | Facility: HOSPITAL | Age: 67
Discharge: HOME | End: 2022-01-24
Attending: INTERNAL MEDICINE
Payer: COMMERCIAL

## 2022-01-24 ENCOUNTER — TELEPHONE (OUTPATIENT)
Dept: ORTHOPEDICS CLINIC | Facility: CLINIC | Age: 67
End: 2022-01-24

## 2022-01-24 VITALS
SYSTOLIC BLOOD PRESSURE: 132 MMHG | TEMPERATURE: 97 F | WEIGHT: 194 LBS | HEART RATE: 72 BPM | DIASTOLIC BLOOD PRESSURE: 76 MMHG | OXYGEN SATURATION: 97 % | BODY MASS INDEX: 26 KG/M2

## 2022-01-24 DIAGNOSIS — M72.0 DUPUYTREN'S DISEASE OF PALM WITH NODULES WITHOUT CONTRACTURE: ICD-10-CM

## 2022-01-24 DIAGNOSIS — M72.0 DUPUYTREN'S DISEASE OF PALM WITH NODULES WITHOUT CONTRACTURE: Primary | ICD-10-CM

## 2022-01-24 PROCEDURE — 73130 X-RAY EXAM OF HAND: CPT | Performed by: INTERNAL MEDICINE

## 2022-01-24 PROCEDURE — 99214 OFFICE O/P EST MOD 30 MIN: CPT | Performed by: INTERNAL MEDICINE

## 2022-01-24 PROCEDURE — 3075F SYST BP GE 130 - 139MM HG: CPT | Performed by: INTERNAL MEDICINE

## 2022-01-24 PROCEDURE — 3078F DIAST BP <80 MM HG: CPT | Performed by: INTERNAL MEDICINE

## 2022-01-24 NOTE — TELEPHONE ENCOUNTER
FINDINGS: Louvella Amaya is no acute fracture.  There is mild osteophyte formation evident at the radiocarpal joint consistent with osteoarthritis.  There is no bone erosion.  There is joint space narrowing involving distal interphalangeal joints of the middle   f

## 2022-01-24 NOTE — PROGRESS NOTES
Patient presents with:  Hand Pain      HPI: Jefferson Carol presents today for eval of R hand symptoms. In particular, he's noticed subcutaneous firm nodular growths on the tendons within the palm of the hand. Onset = at least 6 months.  He noticed them originally wh (36.3 °C) (Other)   Wt 194 lb (88 kg)   SpO2 97%   BMI 25.60 kg/m²   Wt Readings from Last 6 Encounters:  01/24/22 : 194 lb (88 kg)  11/18/21 : 190 lb 1.6 oz (86.2 kg)  05/20/21 : 195 lb 4.8 oz (88.6 kg)  11/19/20 : 191 lb 12.8 oz (87 kg)  11/12/19 : 191 l

## 2022-01-24 NOTE — PATIENT INSTRUCTIONS
Understanding Dupuytren Contracture   Dupuytren contracture is a disease that occurs when the fibrous tissue beneath the skin of the palm and fingers thickens. This tissue is called the palmar fascia.  If the disease gets worse, it can cause small hard up the thickened issue. This helps reduce contracture. It may allow your fingers to straighten again. · Hand exercises. These are often prescribed along with other treatments. They may help stretch and improve the range of motion in the hand and fingers.

## 2022-01-24 NOTE — TELEPHONE ENCOUNTER
Imaging was ordered for this patient for a RT HAND, but it needs to be reviewed to see if additional imaging is needed. If so, please enter the appropriate RX and let the patient know to come in before their appointment to complete the additional imaging.

## 2022-01-26 ENCOUNTER — HOSPITAL ENCOUNTER (OUTPATIENT)
Dept: GENERAL RADIOLOGY | Facility: HOSPITAL | Age: 67
End: 2022-01-26
Attending: INTERNAL MEDICINE
Payer: COMMERCIAL

## 2022-01-26 PROCEDURE — 73130 X-RAY EXAM OF HAND: CPT | Performed by: INTERNAL MEDICINE

## 2022-01-27 ENCOUNTER — OFFICE VISIT (OUTPATIENT)
Dept: ORTHOPEDICS CLINIC | Facility: CLINIC | Age: 67
End: 2022-01-27
Payer: COMMERCIAL

## 2022-01-27 VITALS — WEIGHT: 190 LBS | HEIGHT: 73 IN | BODY MASS INDEX: 25.18 KG/M2

## 2022-01-27 DIAGNOSIS — M72.0 DUPUYTREN'S CONTRACTURE: Primary | ICD-10-CM

## 2022-01-27 PROCEDURE — 3008F BODY MASS INDEX DOCD: CPT | Performed by: ORTHOPAEDIC SURGERY

## 2022-01-27 PROCEDURE — 99203 OFFICE O/P NEW LOW 30 MIN: CPT | Performed by: ORTHOPAEDIC SURGERY

## 2022-01-27 NOTE — H&P
Clinic Note [facility]     Assessment/Plan:  77year old male    1. Dupuytren's disease of right hand–given no significant contracture would recommend observation.   We discussed indication for intervention which would include about 25 to 30 degree f for sleep apnea     Current Outpatient Medications   Medication Sig Dispense Refill   • Sildenafil Citrate 50 MG Oral Tab Take 1 tablet (50 mg total) by mouth daily as needed for Erectile Dysfunction.  8 tablet 3   • ALPRAZOLAM 0.5 MG Oral Tab TAKE 1 TABLET

## 2022-02-09 ENCOUNTER — TELEPHONE (OUTPATIENT)
Dept: INTERNAL MEDICINE CLINIC | Facility: CLINIC | Age: 67
End: 2022-02-09

## 2022-02-09 NOTE — TELEPHONE ENCOUNTER
Left message for patient to return the call and schedule a virtual visit for his recent [place] [facility] results.

## 2022-02-21 ENCOUNTER — VIRTUAL PHONE E/M (OUTPATIENT)
Dept: INTERNAL MEDICINE CLINIC | Facility: CLINIC | Age: 67
End: 2022-02-21
Payer: COMMERCIAL

## 2022-02-21 ENCOUNTER — TELEPHONE (OUTPATIENT)
Dept: INTERNAL MEDICINE CLINIC | Facility: CLINIC | Age: 67
End: 2022-02-21

## 2022-02-21 DIAGNOSIS — Z12.9 SCREENING FOR CANCER: ICD-10-CM

## 2022-02-21 DIAGNOSIS — Z71.2 ENCOUNTER TO DISCUSS TEST RESULTS: Primary | ICD-10-CM

## 2022-02-21 PROCEDURE — 99212 OFFICE O/P EST SF 10 MIN: CPT | Performed by: INTERNAL MEDICINE

## 2022-02-21 NOTE — TELEPHONE ENCOUNTER
Patient is requesting a receipt for his FSA for the grail test he had done. He would like the receipt sent to his e-mail address.      [email or web address]

## 2022-02-22 NOTE — TELEPHONE ENCOUNTER
Per Ileana Gardiner rep: The patient can call out Medical Billing Intake dept and request an itemized receipt [phone]    PSR: please let pt know.  Ty!

## 2022-05-09 RX ORDER — ALPRAZOLAM 0.5 MG/1
0.5 TABLET ORAL NIGHTLY PRN
Qty: 30 TABLET | Refills: 0 | Status: SHIPPED | OUTPATIENT
Start: 2022-05-09

## 2022-05-09 NOTE — TELEPHONE ENCOUNTER
1- Refill request on alprazolam 0.5mg tabs  2- Quantity of: 30 tabs - 0 refills  3- Administration instructions: 1 tab at night PRN  4- Per Epic last e-script was written 12/7/2019, no further refills from our office after that.

## 2022-07-05 ENCOUNTER — VIRTUAL PHONE E/M (OUTPATIENT)
Dept: INTERNAL MEDICINE CLINIC | Facility: CLINIC | Age: 67
End: 2022-07-05
Payer: COMMERCIAL

## 2022-07-05 DIAGNOSIS — R09.81 NASAL CONGESTION: ICD-10-CM

## 2022-07-05 DIAGNOSIS — Z86.16 HISTORY OF COVID-19: Primary | ICD-10-CM

## 2022-07-05 PROCEDURE — 99441 PHONE E/M BY PHYS 5-10 MIN: CPT | Performed by: INTERNAL MEDICINE

## 2022-08-25 ENCOUNTER — TELEPHONE (OUTPATIENT)
Dept: INTERNAL MEDICINE CLINIC | Facility: CLINIC | Age: 67
End: 2022-08-25

## 2022-11-15 ENCOUNTER — NURSE ONLY (OUTPATIENT)
Dept: INTERNAL MEDICINE CLINIC | Facility: CLINIC | Age: 67
End: 2022-11-15
Payer: COMMERCIAL

## 2022-11-15 DIAGNOSIS — Z00.00 LABORATORY EXAMINATION ORDERED AS PART OF A ROUTINE GENERAL MEDICAL EXAMINATION: Primary | ICD-10-CM

## 2022-11-15 LAB
AMB EXT CHLORIDE: 99
AMB EXT CHOL/HDL RATIO: 3.5
AMB EXT CHOLESTEROL, TOTAL: 214 MG/DL
AMB EXT CMP ALT: 9 U/L
AMB EXT CMP AST: 21 U/L
AMB EXT GLUCOSE: 110 MG/DL
AMB EXT HDL CHOLESTEROL: 61 MG/DL
AMB EXT HEMATOCRIT: 47.1
AMB EXT HEMOGLOBIN: 16.2
AMB EXT HGBA1C: 5.4 %
AMB EXT LDL CHOLESTEROL, DIRECT: 130 MG/DL
AMB EXT MCV: 90.2
AMB EXT NON HDL CHOL: 153 MG/DL
AMB EXT POSTASSIUM: 4.1 MMOL/L
AMB EXT PSA SCREEN: 0.78 NG/ML
AMB EXT SODIUM: 139 MMOL/L
AMB EXT TRIGLYCERIDES: 118 MG/DL
AMB EXT TSH: 2.63 MIU/ML
AMB EXT WBC: 5.6 X10(3)UL
BILIRUB UR QL STRIP.AUTO: NEGATIVE
CLARITY UR REFRACT.AUTO: CLEAR
GLUCOSE UR STRIP.AUTO-MCNC: NEGATIVE MG/DL
KETONES UR STRIP.AUTO-MCNC: NEGATIVE MG/DL
LEUKOCYTE ESTERASE UR QL STRIP.AUTO: NEGATIVE
NITRITE UR QL STRIP.AUTO: NEGATIVE
PH UR STRIP.AUTO: 7 [PH] (ref 5–8)
PROT UR STRIP.AUTO-MCNC: NEGATIVE MG/DL
RBC UR QL AUTO: NEGATIVE
SP GR UR STRIP.AUTO: 1 (ref 1–1.03)
UROBILINOGEN UR STRIP.AUTO-MCNC: <2 MG/DL

## 2022-11-15 PROCEDURE — 81003 URINALYSIS AUTO W/O SCOPE: CPT | Performed by: INTERNAL MEDICINE

## 2022-11-15 PROCEDURE — 92551 PURE TONE HEARING TEST AIR: CPT | Performed by: INTERNAL MEDICINE

## 2022-11-15 PROCEDURE — 99173 VISUAL ACUITY SCREEN: CPT | Performed by: INTERNAL MEDICINE

## 2022-11-15 PROCEDURE — 93923 UPR/LXTR ART STDY 3+ LVLS: CPT | Performed by: INTERNAL MEDICINE

## 2022-11-15 NOTE — PROGRESS NOTES
VENIPUNCTURE:x left arm 1 stick landed    ADD-ON TEST:    EKG:    SPIROMETRY:    URINE:x      VISION: x     Right Eye 20/20      Left Eye 20/20     Both Eyes: 20/20      SHAAN:      Right Tibial [id number] ___ divided by highest arm __150_ = __1.1_       Right Dorsal Foot ___ divided by highest arm ___ = ___       Left Tibial Foot _176__ divided by highest arm _150__ = __1.1_       Left Dorsal Foot ___ divided by highest arm ___ = ___      ARM:   Right Brachial-150     Left Brachial-144      FOOT:   Right Tibial (Side)-172    Right Dorsal (Top)-      Left Tibial (Side)-176    Left Dorsal (Top)-      Greater than 1.3: results not reliable  1.01 to 1.3: correlated with history, especially if the patient has diabetes  0.97 to 1: normal  0.8 to 0.96: mild ischemia  0.4 to 0.79: moderate to severe ischemia  0.39 or less: severe ischemia; danger of limb loss

## 2022-12-01 ENCOUNTER — OFFICE VISIT (OUTPATIENT)
Dept: INTERNAL MEDICINE CLINIC | Facility: CLINIC | Age: 67
End: 2022-12-01
Payer: COMMERCIAL

## 2022-12-01 VITALS
DIASTOLIC BLOOD PRESSURE: 78 MMHG | SYSTOLIC BLOOD PRESSURE: 134 MMHG | RESPIRATION RATE: 16 BRPM | TEMPERATURE: 98 F | HEIGHT: 73 IN | HEART RATE: 106 BPM | BODY MASS INDEX: 25.6 KG/M2 | OXYGEN SATURATION: 96 % | WEIGHT: 193.13 LBS

## 2022-12-01 DIAGNOSIS — F51.01 PRIMARY INSOMNIA: ICD-10-CM

## 2022-12-01 DIAGNOSIS — Z13.6 SCREENING FOR HEART DISEASE: ICD-10-CM

## 2022-12-01 DIAGNOSIS — J30.9 CHRONIC ALLERGIC RHINITIS: ICD-10-CM

## 2022-12-01 DIAGNOSIS — H90.3 SENSORINEURAL HEARING LOSS (SNHL), BILATERAL: ICD-10-CM

## 2022-12-01 DIAGNOSIS — M47.816 ARTHRITIS, LUMBAR SPINE: ICD-10-CM

## 2022-12-01 DIAGNOSIS — Z00.00 ROUTINE GENERAL MEDICAL EXAMINATION AT A HEALTH CARE FACILITY: Primary | ICD-10-CM

## 2022-12-01 DIAGNOSIS — N52.03 COMBINED ARTERIAL INSUFFICIENCY AND CORPORO-VENOUS OCCLUSIVE ERECTILE DYSFUNCTION: ICD-10-CM

## 2022-12-01 DIAGNOSIS — Z85.828 PERSONAL HISTORY OF SKIN CANCER: ICD-10-CM

## 2022-12-01 DIAGNOSIS — Z23 NEED FOR PROPHYLACTIC VACCINATION AGAINST STREPTOCOCCUS PNEUMONIAE (PNEUMOCOCCUS): ICD-10-CM

## 2022-12-01 LAB
ATRIAL RATE: 62 BPM
P AXIS: 24 DEGREES
P-R INTERVAL: 138 MS
Q-T INTERVAL: 444 MS
QRS DURATION: 122 MS
QTC CALCULATION (BEZET): 450 MS
R AXIS: -11 DEGREES
T AXIS: 219 DEGREES
VENTRICULAR RATE: 62 BPM

## 2022-12-01 NOTE — PATIENT INSTRUCTIONS
Tara Mojica,     Please get Heart Scan done to make sure we're in the clear. Please see ENT doc because of the failed hearing test screening analysis. Doc will do a proper assessment of hearing and let me know the game plan moving forward. I will MyChart you some support information on Vitamin B12. I'll also send you longevity support document. Please continue with the Omega's and Vitamin D as your levels are great. Don't forget about the bathroom reading material ([address] [place] [address]) for lifestyle tips as we get older. I look forward to discussing more health opportunities with you in the near future. As always, I appreciate being part of your journey and I am here for any questions along the way. On behalf of all of our staff here with [facility], we sincerely thank you for placing your trust in our medical practice. Your partner in health,     Evelin Jean. Adrianne Hawk MD  Diplomate, American Board of Internal Medicine  Member, American College of Lifestyle Medicine  Member, American Association for [address], [address] [place] [address],[address], [place], [address] [place] [address] Rd  [phone] (phone); [phone] (fax)  [email or web address]

## 2022-12-03 PROBLEM — H90.3 SENSORINEURAL HEARING LOSS (SNHL), BILATERAL: Status: ACTIVE | Noted: 2022-12-03

## 2022-12-15 ENCOUNTER — HOSPITAL ENCOUNTER (OUTPATIENT)
Dept: CT IMAGING | Age: 67
Discharge: HOME | End: 2022-12-15
Attending: INTERNAL MEDICINE

## 2022-12-15 DIAGNOSIS — Z13.6 SCREENING FOR HEART DISEASE: ICD-10-CM

## 2022-12-15 DIAGNOSIS — Z13.9 ENCOUNTER FOR SCREENING: ICD-10-CM

## 2022-12-23 ENCOUNTER — TELEPHONE (OUTPATIENT)
Dept: INTERNAL MEDICINE CLINIC | Facility: CLINIC | Age: 67
End: 2022-12-23

## 2022-12-23 DIAGNOSIS — I51.5 CARDIAC CALCIFICATION (HCC): Primary | ICD-10-CM

## 2022-12-23 RX ORDER — ROSUVASTATIN CALCIUM 20 MG/1
20 TABLET, COATED ORAL NIGHTLY
Qty: 90 TABLET | Refills: 3 | Status: SHIPPED | OUTPATIENT
Start: 2022-12-23

## 2022-12-23 NOTE — TELEPHONE ENCOUNTER
I sent a [employer] message to Eitan Tolentino with regards to his CT Heart Scan score which was finalized yesterday afternoon by Dr. Caitie Castillo from Cardiology. His CAC score was 191.94. I told him in my message that this is atherosclerosis and further discussion/workup needs to be done. It is noted that his card cath done on 11/30/18 did not reveal any significant atherosclerosis. Plan:  1. Start Rosuvastatin 20 mg daily. 2. Will discuss with him next week on following steps (presumably Dejuan Dc followed by cardiology appointment). 3. Clinical staff to set up a phone call visit even if it's late in the day on Tuesday or Thursday night. Mariajose Bucio. Kelsey Yepez MD  Diplomate, American Board of Internal Medicine  Member, American College of Lifestyle Medicine  Member, American Association for Physician Leadership  [facility], [address], [facility], [address] [place] [address] Rd  [phone] (phone); [phone] (fax)  [email or web address]

## 2022-12-29 ENCOUNTER — VIRTUAL PHONE E/M (OUTPATIENT)
Dept: INTERNAL MEDICINE CLINIC | Facility: CLINIC | Age: 67
End: 2022-12-29
Payer: COMMERCIAL

## 2022-12-29 DIAGNOSIS — I51.5 CARDIAC CALCIFICATION (HCC): Primary | ICD-10-CM

## 2022-12-29 DIAGNOSIS — I25.10 CORONARY ARTERY DISEASE INVOLVING NATIVE CORONARY ARTERY OF NATIVE HEART WITHOUT ANGINA PECTORIS: ICD-10-CM

## 2022-12-29 PROCEDURE — 99442 PHONE E/M BY PHYS 11-20 MIN: CPT | Performed by: INTERNAL MEDICINE

## 2023-01-04 ENCOUNTER — OFFICE VISIT (OUTPATIENT)
Facility: LOCATION | Age: 68
End: 2023-01-04
Payer: COMMERCIAL

## 2023-01-04 DIAGNOSIS — H90.3 SENSORINEURAL HEARING LOSS (SNHL) OF BOTH EARS: Primary | ICD-10-CM

## 2023-01-04 PROCEDURE — 92567 TYMPANOMETRY: CPT | Performed by: AUDIOLOGIST

## 2023-01-04 PROCEDURE — 92557 COMPREHENSIVE HEARING TEST: CPT | Performed by: AUDIOLOGIST

## 2023-01-04 PROCEDURE — 99203 OFFICE O/P NEW LOW 30 MIN: CPT | Performed by: OTOLARYNGOLOGY

## 2023-01-09 ENCOUNTER — HOSPITAL ENCOUNTER (OUTPATIENT)
Dept: CV DIAGNOSTICS | Facility: HOSPITAL | Age: 68
Discharge: HOME | End: 2023-01-09
Attending: INTERNAL MEDICINE
Payer: COMMERCIAL

## 2023-01-09 DIAGNOSIS — I25.10 CORONARY ARTERY DISEASE INVOLVING NATIVE CORONARY ARTERY OF NATIVE HEART WITHOUT ANGINA PECTORIS: ICD-10-CM

## 2023-01-09 DIAGNOSIS — I51.5 CARDIAC CALCIFICATION (HCC): ICD-10-CM

## 2023-01-09 PROCEDURE — 93018 CV STRESS TEST I&R ONLY: CPT | Performed by: INTERNAL MEDICINE

## 2023-01-09 PROCEDURE — 93017 CV STRESS TEST TRACING ONLY: CPT | Performed by: INTERNAL MEDICINE

## 2023-01-09 PROCEDURE — 78452 HT MUSCLE IMAGE SPECT MULT: CPT | Performed by: INTERNAL MEDICINE

## 2023-02-14 ENCOUNTER — MED REC SCAN ONLY (OUTPATIENT)
Dept: INTERNAL MEDICINE CLINIC | Facility: CLINIC | Age: 68
End: 2023-02-14

## 2023-05-03 ENCOUNTER — LAB ENCOUNTER (OUTPATIENT)
Dept: LAB | Facility: HOSPITAL | Age: 68
End: 2023-05-03
Attending: INTERNAL MEDICINE
Payer: COMMERCIAL

## 2023-05-03 DIAGNOSIS — I25.810 CORONARY ARTERY DISEASE INVOLVING CORONARY BYPASS GRAFT OF NATIVE HEART WITHOUT ANGINA PECTORIS: Primary | ICD-10-CM

## 2023-05-03 DIAGNOSIS — E78.00 HYPERCHOLESTEROLEMIA: ICD-10-CM

## 2023-05-03 DIAGNOSIS — R73.03 PREDIABETES: ICD-10-CM

## 2023-05-03 LAB
ALBUMIN SERPL-MCNC: 3.9 G/DL (ref 3.4–5)
ALBUMIN/GLOB SERPL: 1.3 {RATIO} (ref 1–2)
ALP LIVER SERPL-CCNC: 69 U/L
ALT SERPL-CCNC: 19 U/L
ANION GAP SERPL CALC-SCNC: 1 MMOL/L (ref 0–18)
AST SERPL-CCNC: 21 U/L (ref 15–37)
BILIRUB SERPL-MCNC: 0.9 MG/DL (ref 0.1–2)
BUN BLD-MCNC: 17 MG/DL (ref 7–18)
CALCIUM BLD-MCNC: 9.3 MG/DL (ref 8.5–10.1)
CHLORIDE SERPL-SCNC: 107 MMOL/L (ref 98–112)
CHOLEST SERPL-MCNC: 110 MG/DL (ref ?–200)
CO2 SERPL-SCNC: 31 MMOL/L (ref 21–32)
CREAT BLD-MCNC: 0.9 MG/DL
EST. AVERAGE GLUCOSE BLD GHB EST-MCNC: 117 MG/DL (ref 68–126)
FASTING PATIENT LIPID ANSWER: YES
FASTING STATUS PATIENT QL REPORTED: YES
GFR SERPLBLD BASED ON 1.73 SQ M-ARVRAT: 94 ML/MIN/1.73M2 (ref 60–?)
GLOBULIN PLAS-MCNC: 2.9 G/DL (ref 2.8–4.4)
GLUCOSE BLD-MCNC: 114 MG/DL (ref 70–99)
HBA1C MFR BLD: 5.7 % (ref ?–5.7)
HDLC SERPL-MCNC: 67 MG/DL (ref 40–59)
LDLC SERPL CALC-MCNC: 32 MG/DL (ref ?–100)
NONHDLC SERPL-MCNC: 43 MG/DL (ref ?–130)
OSMOLALITY SERPL CALC.SUM OF ELEC: 290 MOSM/KG (ref 275–295)
POTASSIUM SERPL-SCNC: 4.2 MMOL/L (ref 3.5–5.1)
PROT SERPL-MCNC: 6.8 G/DL (ref 6.4–8.2)
SODIUM SERPL-SCNC: 139 MMOL/L (ref 136–145)
TRIGL SERPL-MCNC: 44 MG/DL (ref 30–149)
VLDLC SERPL CALC-MCNC: 6 MG/DL (ref 0–30)

## 2023-05-03 PROCEDURE — 80061 LIPID PANEL: CPT

## 2023-05-03 PROCEDURE — 83036 HEMOGLOBIN GLYCOSYLATED A1C: CPT

## 2023-05-03 PROCEDURE — 80053 COMPREHEN METABOLIC PANEL: CPT

## 2023-05-03 PROCEDURE — 36415 COLL VENOUS BLD VENIPUNCTURE: CPT

## 2023-05-19 ENCOUNTER — MED REC SCAN ONLY (OUTPATIENT)
Dept: INTERNAL MEDICINE CLINIC | Facility: CLINIC | Age: 68
End: 2023-05-19

## 2023-10-10 ENCOUNTER — PATIENT MESSAGE (OUTPATIENT)
Dept: INTERNAL MEDICINE CLINIC | Facility: CLINIC | Age: 68
End: 2023-10-10

## 2023-10-11 NOTE — TELEPHONE ENCOUNTER
I spoke to Starla last night. All questions answered. Pineda Valera. Michelle Ruiz MD  Diplomate, American Board of Internal Medicine  Member, American College of Lifestyle Medicine  Member, American Association for Physician Leadership  [address] [facility],[address]  [address] [facility], [address] [place] [address],[address], [address] [facility], [address] [place] [address] Rd  [phone] (phone); [phone] (fax)  [email or web address]

## 2023-10-11 NOTE — TELEPHONE ENCOUNTER
From: Jd Chi  To: Amol Rush  Sent: 10/10/2023 5:13 PM CDT  Subject: Continued care    Hi Dr Maya Butler, I received the message from [facility] that you are ending your affiliation with them. I was wondering if you are continuing to practice in the area? 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 What are your future plans?    I have also sent you a message at your dr escamilla email, if that is better to reach you at

## 2023-11-09 ENCOUNTER — LAB ENCOUNTER (OUTPATIENT)
Dept: LAB | Facility: HOSPITAL | Age: 68
End: 2023-11-09
Attending: INTERNAL MEDICINE
Payer: COMMERCIAL

## 2023-11-09 DIAGNOSIS — E78.00 PURE HYPERCHOLESTEROLEMIA: ICD-10-CM

## 2023-11-09 DIAGNOSIS — C44.90 SKIN CANCER: Primary | ICD-10-CM

## 2023-11-09 DIAGNOSIS — R73.03 PREDIABETES: ICD-10-CM

## 2023-11-09 LAB
ALBUMIN SERPL-MCNC: 3.8 G/DL (ref 3.4–5)
ALBUMIN/GLOB SERPL: 1.2 {RATIO} (ref 1–2)
ALP LIVER SERPL-CCNC: 62 U/L
ALT SERPL-CCNC: 14 U/L
ANION GAP SERPL CALC-SCNC: 3 MMOL/L (ref 0–18)
AST SERPL-CCNC: 18 U/L (ref 15–37)
BILIRUB SERPL-MCNC: 1.2 MG/DL (ref 0.1–2)
BUN BLD-MCNC: 18 MG/DL (ref 9–23)
CALCIUM BLD-MCNC: 9.3 MG/DL (ref 8.5–10.1)
CHLORIDE SERPL-SCNC: 106 MMOL/L (ref 98–112)
CHOLEST SERPL-MCNC: 121 MG/DL (ref ?–200)
CO2 SERPL-SCNC: 31 MMOL/L (ref 21–32)
CREAT BLD-MCNC: 1.01 MG/DL
EGFRCR SERPLBLD CKD-EPI 2021: 81 ML/MIN/1.73M2 (ref 60–?)
EST. AVERAGE GLUCOSE BLD GHB EST-MCNC: 123 MG/DL (ref 68–126)
FASTING PATIENT LIPID ANSWER: YES
FASTING STATUS PATIENT QL REPORTED: YES
GLOBULIN PLAS-MCNC: 3.1 G/DL (ref 2.8–4.4)
GLUCOSE BLD-MCNC: 103 MG/DL (ref 70–99)
HBA1C MFR BLD: 5.9 % (ref ?–5.7)
HDLC SERPL-MCNC: 70 MG/DL (ref 40–59)
LDLC SERPL CALC-MCNC: 36 MG/DL (ref ?–100)
NONHDLC SERPL-MCNC: 51 MG/DL (ref ?–130)
OSMOLALITY SERPL CALC.SUM OF ELEC: 292 MOSM/KG (ref 275–295)
POTASSIUM SERPL-SCNC: 4.6 MMOL/L (ref 3.5–5.1)
PROT SERPL-MCNC: 6.9 G/DL (ref 6.4–8.2)
SODIUM SERPL-SCNC: 140 MMOL/L (ref 136–145)
TRIGL SERPL-MCNC: 73 MG/DL (ref 30–149)
VLDLC SERPL CALC-MCNC: 10 MG/DL (ref 0–30)

## 2023-11-09 PROCEDURE — 36415 COLL VENOUS BLD VENIPUNCTURE: CPT

## 2023-11-09 PROCEDURE — 83036 HEMOGLOBIN GLYCOSYLATED A1C: CPT

## 2023-11-09 PROCEDURE — 80053 COMPREHEN METABOLIC PANEL: CPT

## 2023-11-09 PROCEDURE — 80061 LIPID PANEL: CPT

## 2023-12-04 ENCOUNTER — NURSE ONLY (OUTPATIENT)
Dept: INTERNAL MEDICINE CLINIC | Facility: CLINIC | Age: 68
End: 2023-12-04
Payer: COMMERCIAL

## 2023-12-04 DIAGNOSIS — Z00.00 LABORATORY EXAMINATION ORDERED AS PART OF A ROUTINE GENERAL MEDICAL EXAMINATION: Primary | ICD-10-CM

## 2023-12-04 LAB
AMB EXT BILIRUBIN, TOTAL: 1 MG/DL
AMB EXT BUN: 19 MG/DL
AMB EXT CALCIUM: 9.9
AMB EXT CARBON DIOXIDE: 29
AMB EXT CHLORIDE: 100
AMB EXT CHOL/HDL RATIO: 2.5
AMB EXT CHOLESTEROL, TOTAL: 145 MG/DL
AMB EXT CMP ALT: 12 U/L (ref 9–46)
AMB EXT CMP AST: 23 U/L (ref 10–35)
AMB EXT CREATININE: 0.98 MG/DL
AMB EXT GLUCOSE: 106 MG/DL
AMB EXT HDL CHOLESTEROL: 59 MG/DL
AMB EXT HEMATOCRIT: 47.8 (ref 38.5–50)
AMB EXT HEMOGLOBIN: 16.2 (ref 13.2–17.1)
AMB EXT HGBA1C: 5.6 %
AMB EXT LDL CHOLESTEROL, DIRECT: 68 MG/DL
AMB EXT MCV: 92.6 (ref 80–100)
AMB EXT NON HDL CHOL: 86 MG/DL
AMB EXT PLATELETS: 254 (ref 140–400)
AMB EXT POSTASSIUM: 4.5 MMOL/L (ref 3.5–5.1)
AMB EXT PSA SCREEN: 0.47 NG/ML
AMB EXT SODIUM: 140 MMOL/L (ref 136–145)
AMB EXT TOTAL PROTEIN: 6.6 (ref 6.1–8)
AMB EXT TRIGLYCERIDES: 100 MG/DL
AMB EXT TSH: 1.49 MIU/ML
AMB EXT WBC: 5.4 X10(3)UL
BILIRUB UR QL STRIP.AUTO: NEGATIVE
CLARITY UR REFRACT.AUTO: CLEAR
COLOR UR AUTO: COLORLESS
GLUCOSE UR STRIP.AUTO-MCNC: NORMAL MG/DL
KETONES UR STRIP.AUTO-MCNC: NEGATIVE MG/DL
LEUKOCYTE ESTERASE UR QL STRIP.AUTO: NEGATIVE
NITRITE UR QL STRIP.AUTO: NEGATIVE
PH UR STRIP.AUTO: 7 [PH] (ref 5–8)
PROT UR STRIP.AUTO-MCNC: NEGATIVE MG/DL
RBC UR QL AUTO: NEGATIVE
SP GR UR STRIP.AUTO: 1.01 (ref 1–1.03)
UROBILINOGEN UR STRIP.AUTO-MCNC: NORMAL MG/DL

## 2023-12-04 PROCEDURE — 99173 VISUAL ACUITY SCREEN: CPT | Performed by: INTERNAL MEDICINE

## 2023-12-04 PROCEDURE — 81003 URINALYSIS AUTO W/O SCOPE: CPT | Performed by: INTERNAL MEDICINE

## 2023-12-04 PROCEDURE — 93923 UPR/LXTR ART STDY 3+ LVLS: CPT | Performed by: INTERNAL MEDICINE

## 2023-12-04 NOTE — PROGRESS NOTES
VENIPUNCTURE: Left arm 1 stick landed    ADD-ON TEST:    EKG:    SPIROMETRY:    URINE: Yes      VISION: [facility] last eye exam 5/2022     Right Eye 20/20      Left Eye 20/20     Both Eyes: 20/20      SHAAN:      Right Tibial Foot _162__ divided by highest arm __144_ = __1.1_       Right Dorsal Foot ___ divided by highest arm ___ = ___       Left Tibial Foot _170__ divided by highest arm __144_ = __1.1_       Left Dorsal Foot ___ divided by highest arm ___ = ___      ARM:   Right Brachial-162     Left Brachial-170      FOOT:   Right Tibial (Side)-162    Right Dorsal (Top)-      Left Tibial (Side)-170    Left Dorsal (Top)-      Greater than 1.3: results not reliable  1.01 to 1.3: correlated with history, especially if the patient has diabetes  0.97 to 1: normal  0.8 to 0.96: mild ischemia  0.4 to 0.79: moderate to severe ischemia  0.39 or less: severe ischemia; danger of limb loss

## 2023-12-14 ENCOUNTER — TELEPHONE (OUTPATIENT)
Dept: INTERNAL MEDICINE CLINIC | Facility: CLINIC | Age: 68
End: 2023-12-14

## 2023-12-14 NOTE — TELEPHONE ENCOUNTER
Left a message for the patient to return the call regarding recent lab results ([facility] screening).

## 2023-12-18 ENCOUNTER — OFFICE VISIT (OUTPATIENT)
Dept: INTERNAL MEDICINE CLINIC | Facility: CLINIC | Age: 68
End: 2023-12-18
Payer: COMMERCIAL

## 2023-12-18 ENCOUNTER — TELEPHONE (OUTPATIENT)
Dept: INTERNAL MEDICINE CLINIC | Facility: CLINIC | Age: 68
End: 2023-12-18

## 2023-12-18 VITALS
TEMPERATURE: 98 F | BODY MASS INDEX: 25.82 KG/M2 | HEART RATE: 62 BPM | RESPIRATION RATE: 16 BRPM | OXYGEN SATURATION: 98 % | DIASTOLIC BLOOD PRESSURE: 78 MMHG | HEIGHT: 72.75 IN | SYSTOLIC BLOOD PRESSURE: 110 MMHG | WEIGHT: 194.81 LBS

## 2023-12-18 DIAGNOSIS — Z00.00 ROUTINE GENERAL MEDICAL EXAMINATION AT A HEALTH CARE FACILITY: Primary | ICD-10-CM

## 2023-12-18 DIAGNOSIS — N52.03 COMBINED ARTERIAL INSUFFICIENCY AND CORPORO-VENOUS OCCLUSIVE ERECTILE DYSFUNCTION: ICD-10-CM

## 2023-12-18 DIAGNOSIS — F51.01 PRIMARY INSOMNIA: ICD-10-CM

## 2023-12-18 DIAGNOSIS — I25.10 CORONARY ARTERY DISEASE INVOLVING NATIVE CORONARY ARTERY OF NATIVE HEART WITHOUT ANGINA PECTORIS: ICD-10-CM

## 2023-12-18 DIAGNOSIS — H90.3 SENSORINEURAL HEARING LOSS (SNHL), BILATERAL: ICD-10-CM

## 2023-12-18 DIAGNOSIS — Z85.828 PERSONAL HISTORY OF SKIN CANCER: ICD-10-CM

## 2023-12-18 DIAGNOSIS — I51.5 CARDIAC CALCIFICATION (HCC): ICD-10-CM

## 2023-12-18 DIAGNOSIS — M47.816 ARTHRITIS, LUMBAR SPINE: ICD-10-CM

## 2023-12-18 DIAGNOSIS — J30.9 CHRONIC ALLERGIC RHINITIS: ICD-10-CM

## 2023-12-18 LAB
ATRIAL RATE: 61 BPM
P AXIS: 32 DEGREES
P-R INTERVAL: 156 MS
Q-T INTERVAL: 426 MS
QRS DURATION: 124 MS
QTC CALCULATION (BEZET): 428 MS
R AXIS: -26 DEGREES
T AXIS: 247 DEGREES
VENTRICULAR RATE: 61 BPM

## 2023-12-18 RX ORDER — ROSUVASTATIN CALCIUM 10 MG/1
10 TABLET, COATED ORAL NIGHTLY
COMMUNITY
Start: 2023-11-21

## 2023-12-18 RX ORDER — VITAMIN B COMPLEX
TABLET ORAL
COMMUNITY
Start: 2023-05-15

## 2024-05-17 ENCOUNTER — LAB ENCOUNTER (OUTPATIENT)
Dept: LAB | Age: 69
End: 2024-05-17
Attending: INTERNAL MEDICINE

## 2024-05-17 DIAGNOSIS — E78.00 PURE HYPERCHOLESTEROLEMIA: Primary | ICD-10-CM

## 2024-05-17 DIAGNOSIS — R73.03 BORDERLINE DIABETES: ICD-10-CM

## 2024-05-17 LAB
ALBUMIN SERPL-MCNC: 3.9 G/DL (ref 3.4–5)
ALBUMIN/GLOB SERPL: 1.3 {RATIO} (ref 1–2)
ALP LIVER SERPL-CCNC: 63 U/L
ALT SERPL-CCNC: 20 U/L
ANION GAP SERPL CALC-SCNC: 5 MMOL/L (ref 0–18)
AST SERPL-CCNC: 30 U/L (ref 15–37)
BILIRUB SERPL-MCNC: 1.4 MG/DL (ref 0.1–2)
BUN BLD-MCNC: 19 MG/DL (ref 9–23)
CALCIUM BLD-MCNC: 9.2 MG/DL (ref 8.5–10.1)
CHLORIDE SERPL-SCNC: 104 MMOL/L (ref 98–112)
CHOLEST SERPL-MCNC: 133 MG/DL (ref ?–200)
CO2 SERPL-SCNC: 29 MMOL/L (ref 21–32)
CREAT BLD-MCNC: 1 MG/DL
EGFRCR SERPLBLD CKD-EPI 2021: 82 ML/MIN/1.73M2 (ref 60–?)
EST. AVERAGE GLUCOSE BLD GHB EST-MCNC: 120 MG/DL (ref 68–126)
FASTING PATIENT LIPID ANSWER: YES
FASTING STATUS PATIENT QL REPORTED: YES
GLOBULIN PLAS-MCNC: 3 G/DL (ref 2.8–4.4)
GLUCOSE BLD-MCNC: 95 MG/DL (ref 70–99)
HBA1C MFR BLD: 5.8 % (ref ?–5.7)
HDLC SERPL-MCNC: 71 MG/DL (ref 40–59)
LDLC SERPL CALC-MCNC: 51 MG/DL (ref ?–100)
NONHDLC SERPL-MCNC: 62 MG/DL (ref ?–130)
OSMOLALITY SERPL CALC.SUM OF ELEC: 288 MOSM/KG (ref 275–295)
POTASSIUM SERPL-SCNC: 4.1 MMOL/L (ref 3.5–5.1)
PROT SERPL-MCNC: 6.9 G/DL (ref 6.4–8.2)
SODIUM SERPL-SCNC: 138 MMOL/L (ref 136–145)
TRIGL SERPL-MCNC: 46 MG/DL (ref 30–149)
VLDLC SERPL CALC-MCNC: 7 MG/DL (ref 0–30)

## 2024-05-17 PROCEDURE — 80061 LIPID PANEL: CPT

## 2024-05-17 PROCEDURE — 80053 COMPREHEN METABOLIC PANEL: CPT

## 2024-05-17 PROCEDURE — 83036 HEMOGLOBIN GLYCOSYLATED A1C: CPT

## 2024-07-15 PROBLEM — Z12.11 SPECIAL SCREENING FOR MALIGNANT NEOPLASMS, COLON: Status: ACTIVE | Noted: 2024-07-15

## 2025-01-09 PROBLEM — Z12.11 SPECIAL SCREENING FOR MALIGNANT NEOPLASMS, COLON: Status: RESOLVED | Noted: 2024-07-15 | Resolved: 2025-01-09

## 2025-01-14 ENCOUNTER — PATIENT MESSAGE (OUTPATIENT)
Dept: INTERNAL MEDICINE CLINIC | Facility: CLINIC | Age: 70
End: 2025-01-14

## 2025-01-14 NOTE — TELEPHONE ENCOUNTER
Please continue to keep us updated as he sees fit.  He is due for his updated Executive Physical & Wellness Exam and should schedule accordingly.       Josue Nielson MD  [facility] Physician  Diplomate, American Board of Internal Medicine  Member, American College of Lifestyle Medicine  Member, American Association for Physician Leadership  [address], [address], [place] [address]  [phone] (phone); [phone] (fax)  [email or web address]

## 2025-01-14 NOTE — TELEPHONE ENCOUNTER
Sent message via [employer] informing of PCP's response and requested he call office to schedule physical

## 2025-01-14 NOTE — TELEPHONE ENCOUNTER
TC to Patient ([phone])     Sx present since Saturday  No known exposures to COVID/Flu/RSV  Did home COVID test Monday - negative    Originally started with nasal congestion, but states this seems to have resolved overnight, feels his sx have moved into his chest    Cough is now sporadic but was persistent when he had nasal congestion  Cough is non-productive    +body aches    Reports pain in chest when coughing only, otherwise denies  Denies fever, chest tightness/pressure, SOB, wheezing     Encouraged OV for exam but Patient declined, states he wants to give his sx more time    Discussed sx management, including fluids, rest, OTC pain medications such as Tylenol or Ibuprofen for aches, and OTC cold/cough medications    Requested follow up for new/worsening sx such SOB or wheezing, or if sx persist.    Patient verbalized understanding    Routed to Dr. Nielson to inform

## 2025-03-04 ENCOUNTER — NURSE ONLY (OUTPATIENT)
Facility: CLINIC | Age: 70
End: 2025-03-04
Payer: COMMERCIAL

## 2025-03-04 DIAGNOSIS — Z00.00 LABORATORY EXAMINATION ORDERED AS PART OF A ROUTINE GENERAL MEDICAL EXAMINATION: Primary | ICD-10-CM

## 2025-03-04 LAB
BILIRUB UR QL STRIP.AUTO: NEGATIVE
CLARITY UR REFRACT.AUTO: CLEAR
COLOR UR AUTO: YELLOW
GLUCOSE UR STRIP.AUTO-MCNC: NORMAL MG/DL
KETONES UR STRIP.AUTO-MCNC: NEGATIVE MG/DL
LEUKOCYTE ESTERASE UR QL STRIP.AUTO: NEGATIVE
NITRITE UR QL STRIP.AUTO: NEGATIVE
PH UR STRIP.AUTO: 7 [PH] (ref 5–8)
PROT UR STRIP.AUTO-MCNC: NEGATIVE MG/DL
RBC UR QL AUTO: NEGATIVE
SP GR UR STRIP.AUTO: 1.01 (ref 1–1.03)
UROBILINOGEN UR STRIP.AUTO-MCNC: NORMAL MG/DL

## 2025-03-04 PROCEDURE — 99173 VISUAL ACUITY SCREEN: CPT | Performed by: INTERNAL MEDICINE

## 2025-03-04 PROCEDURE — 93923 UPR/LXTR ART STDY 3+ LVLS: CPT | Performed by: INTERNAL MEDICINE

## 2025-03-04 PROCEDURE — 81003 URINALYSIS AUTO W/O SCOPE: CPT | Performed by: INTERNAL MEDICINE

## 2025-03-04 NOTE — PROGRESS NOTES
VENIPUNCTURE: left arm 1 stick landed    ADD-ON TEST:    EKG:    SPIROMETRY:    URINE: Yes      VISION: [facility] last eye exam 4/2024     Right Eye 20/20      Left Eye 20/20     Both Eyes: 20/20      SHAAN:      Right Tibial Foot _162__ divided by highest arm _142__ = _1.14__       Right Dorsal Foot ___ divided by highest arm ___ = ___       Left Tibial Foot _170__ divided by highest arm _142__ = _1.19__       Left Dorsal Foot ___ divided by highest arm ___ = ___      ARM:   Right Brachial-134     Left Brachial-142      FOOT:   Right Tibial (Side)-162    Right Dorsal (Top)-      Left Tibial (Side)-170    Left Dorsal (Top)-      Greater than 1.3: results not reliable  1.01 to 1.3: correlated with history, especially if the patient has diabetes  0.97 to 1: normal  0.8 to 0.96: mild ischemia  0.4 to 0.79: moderate to severe ischemia  0.39 or less: severe ischemia; danger of limb loss

## 2025-03-17 NOTE — PROGRESS NOTES
The 21st Century Cures Act makes medical notes like these available to patients in the interest of transparency. Please be advised this is a medical document. Medical documents are intended to carry relevant information, facts as evident, and the clinical opinion of the practitioner. The medical note is intended as peer to peer communication and may appear blunt or direct. It is written in medical language and may contain abbreviations or verbiage that are unfamiliar.         Chief Complaint   Patient presents with    Well Adult       History of Present Illness  Catarino Vicente is a 69 year old male who presents for an annual physical exam.    He is currently focusing on maintaining his fitness through biking as his primary form of exercise. He acknowledges the need to return to the gym, which he has not been attending recently. He is in the process of moving to [place], [place], which he anticipates will allow for more outdoor activities and a return to the health club.    In terms of nutrition, he relies on his wife for meal planning and ensures that he incorporates one or two vegetables with dinner regularly. He occasionally prepares vegetarian meals and includes lean proteins such as chicken and fish in his diet. He is mindful of his water intake and does not need to consciously think about it.    Regarding sleep, he aims for eight hours per night but has experienced poor sleep over the past month due to packing for his move. Normally, he feels rested after a typical night's sleep.    Socially, he feels he has good support and is active in making friends and participating in activities like a bowling league. He looks forward to meeting new people in his new community.    He has a history of skin cancer and continues to see a dermatologist annually for screenings. He has experienced arthritis in his lumbar spine, which has improved since switching from running to biking. He notes occasional  soreness but no significant pain.    He uses over-the-counter Zyrtec and Nasacort for allergies, which he anticipates may change with his move to [place].    He reports infrequent use of alprazolam 0.5 mg, primarily for insomnia rather than anxiety.    Review of Systems   All other systems reviewed and are negative.    Patient Active Problem List   Diagnosis    Primary insomnia    Personal history of skin cancer    Chronic allergic rhinitis    Arthritis, lumbar spine (HCC) - seen on X-ray dated 8/21/18    Combined arterial insufficiency and corporo-venous occlusive erectile dysfunction    Sensorineural hearing loss (SNHL), bilateral    Cardiac calcification (HCC) - CAC score of 191.94 seen on 12/15/22 CT Heart Scan protocol    Coronary artery disease involving native coronary artery of native heart without angina pectoris    Wears hearing aid in both ears       Past Surgical History:   Procedure Laterality Date    Colonoscopy      Hand/finger surgery unlisted Right 8/28/15--Dr. Storey    long finger mucous cyst excision    Hand/finger surgery unlisted Right 10/17/18--Dr. Storey    middle finger mucous cyst excision    Palate/uvula surgery unlisted      age 44 for sleep apnea       Seasonal      Current Outpatient Medications:     ALPRAZolam 0.5 MG Oral Tab, Take 1 tablet (0.5 mg total) by mouth nightly as needed for Sleep., Disp: 30 tablet, Rfl: 0    rosuvastatin 10 MG Oral Tab, Take 1 tablet (10 mg total) by mouth nightly., Disp: , Rfl:     B Complex Vitamins (B-COMPLEX/B-12) Oral Tab, , Disp: , Rfl:     Fexofenadine HCl (ALLEGRA OR), Take 1 tablet by mouth daily as needed.  , Disp: , Rfl:     Cholecalciferol (VITAMIN D) 2000 UNITS Oral Tab, Take 2 capsules by mouth daily., Disp: , Rfl:     Omega-3 Fatty Acids (FISH OIL BURP-LESS) 1200 MG Oral Cap, Take 2 cap by mouth daily, Disp: , Rfl:     Multiple Vitamin (MULTI-VITAMIN) Oral Tab, Take 1 tablet by mouth daily., Disp: , Rfl:     Social History      Socioeconomic History    Marital status:    Tobacco Use    Smoking status: Never    Smokeless tobacco: Never   Vaping Use    Vaping status: Never Used   Substance and Sexual Activity    Alcohol use: Yes     Alcohol/week: 11.0 standard drinks of alcohol     Types: 5 Glasses of wine, 4 Cans of beer, 2 Shots of liquor per week     Comment: occ~ twice per week    Drug use: No   Other Topics Concern    Caffeine Concern Yes     Comment: 2 cups of coffee every morning.     Stress Concern No    Weight Concern No    Special Diet No    Exercise Yes     Comment: 2-3x/week.     Seat Belt Yes     Social Drivers of Health     Food Insecurity: No Food Insecurity (3/17/2025)    NCSS - Food Insecurity     Worried About Running Out of Food in the Last Year: No     Ran Out of Food in the Last Year: No   Transportation Needs: No Transportation Needs (3/17/2025)    NCSS - Transportation     Lack of Transportation: No   Housing Stability: At Risk (3/17/2025)    NCSS - Housing/Utilities     Has Housing: No     Worried About Losing Housing: No     Unable to Get Utilities: No       Family History   Problem Relation Age of Onset    Other (Parkinson's) Father     Breast Cancer Mother     Cancer Mother         Breast CA    Mental Disorder Son     Diabetes Neg     Heart Disease Neg     Stroke Neg        Immunization History   Administered Date(s) Administered    Covid-19 Vaccine Pfizer 30 mcg/0.3 ml 03/11/2021, 03/31/2021, 10/08/2021    Covid-19 Vaccine Pfizer Bivalent 30mcg/0.3mL 09/30/2022    Covid-19 Vaccine Pfizer Antwon-Sucrose 30 mcg/0.3 ml 04/08/2022    FLU VAC High Dose 65 YRS & Older PRSV Free (91954) 10/11/2020, 09/22/2023    Fluzone Vaccine Medicare () 10/15/2013    Influenza 08/21/2009, 12/22/2010, 11/01/2014, 11/22/2015, 09/10/2016, 10/15/2017, 10/05/2018, 10/11/2020, 10/08/2021, 10/14/2022, 09/20/2024    Influenza(Afluria)0.5ml QIV PFS 10/23/2019    Pfizer Covid-19 Vaccine 30mcg/0.3ml 12yrs+ 09/22/2023     Pneumococcal Conjugate PCV20 12/01/2022    Pneumovax 23 10/11/2020    TDAP 11/25/2013    Td 07/05/2011    Zoster Vaccine Live (Zostavax) 11/03/2017    Zoster Vaccine Recombinant Adjuvanted (Shingrix) 11/19/2020, 05/20/2021       Physical Exam  /72 (BP Location: Left arm, Patient Position: Sitting, Cuff Size: adult)   Pulse 73   Temp 97.7 °F (36.5 °C) (Temporal)   Ht 6' 1\" (1.854 m)   Wt 194 lb (88 kg)   SpO2 99%   BMI 25.60 kg/m²    Wt Readings from Last 6 Encounters:   03/17/25 194 lb (88 kg)   06/24/24 195 lb (88.5 kg)   12/18/23 194 lb 12.8 oz (88.4 kg)   12/01/22 193 lb 1.6 oz (87.6 kg)   01/27/22 190 lb (86.2 kg)   01/24/22 194 lb (88 kg)   GEN: Alert and oriented x 3, NAD.  HEENT: Oral pharynx clear.  NECK: Carotid arteries 2+ bilaterally. No carotid bruits. No thyromegaly.  CHEST: Lungs clear to auscultation bilaterally.  CARDIOVASCULAR: Regular rate and rhythm, normal S1, S2, no murmurs.  ABDOMEN: Normal active bowel sounds. Abdomen nontender and nondistended.  EXTREMITIES: Nodule on right second toe, 7 mm, fluid-filled, no pain.  NEUROLOGICAL: Neurologically grossly intact.      Results  LABS  Myeloperoxidase: 213 (03/04/2025)  LP-PLA2 activity: 76 (03/04/2025)  HSCRP: 0.7 (03/04/2025)  Oxidized LDL: 29 (03/04/2025)  Total cholesterol: 133 (03/04/2025)  HDL: 61 (03/04/2025)  Triglycerides: 88 (03/04/2025)  LDL: 55 (03/04/2025)  Apolipoprotein B: 62 (03/04/2025)  Hemoglobin A1c: 5.9% (03/04/2025)  Average glucose: 121 (03/04/2025)  Coenzyme Q10: 0.77 (03/04/2025)  Vitamin B12: 732 (03/04/2025)  Fasting glucose: 110 (03/04/2025)  Calcium total: 10.0 (03/04/2025)  Sodium: 139 (03/04/2025)  Potassium: 4.1 (03/04/2025)  BUN: 15 (03/04/2025)  Creatinine: 0.91 (03/04/2025)  AST: 23 (03/04/2025)  ALT: 10 (03/04/2025)  Estimated glomerular filtration rate: 91 (03/04/2025)  FIB-4 index: 1.96 (03/04/2025)  Testosterone total: 504 (03/04/2025)  TSH: 1.83 (03/04/2025)  PSA: 0.536 (03/04/2025)  WBC: 6.0  (03/04/2025)  Hemoglobin: 16.1 (03/04/2025)  Platelet count: 259 (03/04/2025)    RADIOLOGY  CT heart scan: Score of 191.94 (12/15/2022)    DIAGNOSTIC  EKG: Normal sinus rhythm, heart rate 66 bpm, no significant change compared to previous EKG dated 12/18/2023 (03/17/2025)  Nuclear stress test: Normal (05/2024)  Ankle brachial index: Right SHAAN 1.14, Left SHAAN 1.19 (03/04/2025)   strength: Left hand 94.1 lbs, Right hand 102.7 lbs (03/04/2025)  Gait speed: 3 seconds to walk 4 meters (03/04/2025)  Eye exam: Right eye 20/20, left eye 20/20, both eyes 20/20 with corrected vision (03/04/2025)  oohilove smart scale balance score: 4 out of 10, 35th percentile (03/04/2025)      Assessment & Plan  Prediabetes  Hemoglobin A1c increased to 5.9%, indicating prediabetes. Discussed glucose monitoring and lifestyle modifications.  - Send information on continuous glucose monitors.  - Order hemoglobin A1c and fasting glucose tests in 90 days at [facility], [place].  We can do a follow-up virtual visit at that time to discuss specifics.  - Encourage lifestyle modifications to lower blood glucose levels.    Coronary Artery Disease and cardiac calcification seen on previous CT heart scan protocol in December 2022  Lipid profile well-controlled on rosuvastatin. Previous plaque burden noted. No immediate cardiology follow-up needed.  - Continue rosuvastatin 10 mg daily.  - He had an unremarkable nuclear stress test in January 2023.  Schedule a stress test (nuclear) in January 2026.    Personal history of skin cancer  Continues annual dermatology screenings.  - Continue annual dermatology visits for skin cancer screenings.    Hearing Loss  Uses hearing aids effectively.  - Continue using hearing aids as needed.    Allergic Rhinitis  Managed with over-the-counter medications.  - Continue using over-the-counter Zyrtec and Nasacort as needed.    Osteoarthritis of the lumbar spine  As mentioned above, he is not really having any  significant issues.  - Continue global back support measures.    ED  Historical no new issues.  - Continue general support as needed.    General Health Maintenance  Undergoing wellness exam. Moving to [place], [place], impacting healthcare providers. Active lifestyle and good nutrition supported by wife. Aims for eight hours of sleep. Good social support and community involvement.  - Continue regular physical activity, including biking and gym workouts.  - Maintain a balanced diet with fruits, vegetables, lean proteins, and whole grains.  - Aim for consistent sleep patterns, targeting eight hours per night.  - Foster social connections and community involvement.  - Consider reading 'Outlive' by Dr. Marques Hubbard for insights on healthspan and lifespan.  - Explore healthcare providers in [place], [place].  - Perform Galleri Test as an adjunct to USPSTF age-appropriate cancer screening guidelines.  - Send information on Genomics/Polygenic Risk Scores and Catarino can let me know when he's ready to move forward.    Follow-up  Planning to move to [place], may need to transition healthcare providers.  - Coordinate with new healthcare providers in [place].  - Consider annual visits to current provider if feasible.    RTC PRN or at next regularly scheduled OV.  Conceivably, we would see him back virtually in 3 months for a prediabetes follow-up.  If he does proceed forward with polygenic risk score testing, we will touch base once results are back.    He verbally agrees to and understands the plan as outlined above.  He was also afforded the time and opportunity to ask questions, which were then answered to the best of my ability.      Josue Nielson MD  [facility] Physician  Diplomate, American Board of Internal Medicine  Member, American College of Lifestyle Medicine  Member, American Association for Physician Leadership  [facility]  [address], [address],  [place] [address]  [phone] (phone); [phone] (fax)  [email or web address]

## 2025-07-01 NOTE — TELEPHONE ENCOUNTER
[facility] labs dated 06/24/2025 noted  A1c: 6.0  Estimated Average Glucose: 125  Glucose: 97    Responded to Patient    Result printed, placed on PCP's desk for review

## 2025-07-02 NOTE — TELEPHONE ENCOUNTER
Please let Catarino know that his fasting glucose was 97, hemoglobin A1c 6.0%, estimated average glucose 125.  This was on testing dated 6/24/2025.  This was reported on 6/28/2025.    When compared to previous testing on file dated 3/4/2025, the fasting glucose was 110, hemoglobin A1c is 5.9%, and estimated average glucose 121.    Prior to that, on 12/4/2023 the fasting glucose was 106, hemoglobin A1c 5.6%, and estimated average glucose 114.    Please let Catarino know that I am here to help along the way.  I can answer any other questions he may have.       Josue Nielson MD  [facility] Physician  Diplomate, American Board of Internal Medicine  Member, American College of Lifestyle Medicine  Member, American Association for Physician Leadership  [address], [facility] [address], [place] [address]  [phone] (phone); [phone] (fax)  [email or web address]

## (undated) NOTE — Clinical Note
Hi, Nile! This is a new patient who will be seeing you for bilateral SNHL as evidenced on his executive physical. He'll call your office to schedule.   Olivier Smith

## (undated) NOTE — Clinical Note
Chary Hodges, new patient to see you for eval of Dupuytren's disease in the R hand. My staff will be calling your staff to see if we can facilitate an appointment.  Liza Billings

## (undated) NOTE — Clinical Note
Please fax a copy of my note to Dr. Stephani Ramirez from [address] [place] [address]. Thx.   Dr. Mansoor Hooks

## (undated) NOTE — LETTER
[facility] [address], [address]  Consent for Procedure/Sedation    Date: 11/29/18   Time:       1.  I authorize the performance upon Mandy Jensen the following:cardiac catheterization, left ventricular cineangiography, bilateral period, the physician will determine when the applicable recovery period ends for purposes of reinstating the Do Not Resuscitate (DNR) order.     Signature of Patient: ____________________________________________________    Signature of person authorized